# Patient Record
Sex: FEMALE | Race: WHITE | NOT HISPANIC OR LATINO | Employment: UNEMPLOYED | URBAN - NONMETROPOLITAN AREA
[De-identification: names, ages, dates, MRNs, and addresses within clinical notes are randomized per-mention and may not be internally consistent; named-entity substitution may affect disease eponyms.]

---

## 2023-02-19 RX ORDER — EZETIMIBE 10 MG/1
1 TABLET ORAL DAILY
Qty: 30 TABLET | Refills: 2 | COMMUNITY
Start: 2022-12-01 | End: 2023-06-09 | Stop reason: SDUPTHER

## 2023-02-19 RX ORDER — TRAZODONE HYDROCHLORIDE 100 MG/1
100 TABLET ORAL NIGHTLY
COMMUNITY
Start: 2022-12-01 | End: 2023-07-19 | Stop reason: ALTCHOICE

## 2023-02-19 RX ORDER — METOPROLOL TARTRATE 25 MG/1
25 TABLET, FILM COATED ORAL 2 TIMES DAILY
COMMUNITY
Start: 2022-12-01 | End: 2023-06-09 | Stop reason: SDUPTHER

## 2023-02-19 RX ORDER — POTASSIUM CHLORIDE 750 MG/1
10 TABLET, FILM COATED, EXTENDED RELEASE ORAL DAILY
COMMUNITY
End: 2023-07-21 | Stop reason: SDUPTHER

## 2023-02-19 RX ORDER — ROSUVASTATIN CALCIUM 10 MG/1
20 TABLET, COATED ORAL DAILY
COMMUNITY
Start: 2022-12-01 | End: 2023-06-09 | Stop reason: SDUPTHER

## 2023-02-19 RX ORDER — FERROUS SULFATE 325(65) MG
65 TABLET ORAL
COMMUNITY
End: 2023-06-09 | Stop reason: SDUPTHER

## 2023-02-19 RX ORDER — DULOXETIN HYDROCHLORIDE 60 MG/1
60 CAPSULE, DELAYED RELEASE ORAL DAILY
COMMUNITY
Start: 2022-12-01 | End: 2023-06-09 | Stop reason: SDUPTHER

## 2023-02-19 RX ORDER — GABAPENTIN 100 MG/1
100 CAPSULE ORAL 3 TIMES DAILY
COMMUNITY
End: 2023-07-19 | Stop reason: ALTCHOICE

## 2023-02-23 PROBLEM — I67.2 INTRACRANIAL ARTERIOSCLEROSIS: Status: ACTIVE | Noted: 2023-02-23

## 2023-02-23 PROBLEM — I48.0 PAROXYSMAL ATRIAL FIBRILLATION (MULTI): Status: ACTIVE | Noted: 2023-02-23

## 2023-02-23 PROBLEM — G89.0 CENTRAL PAIN SYNDROME: Status: ACTIVE | Noted: 2023-02-23

## 2023-02-23 PROBLEM — G47.00 INSOMNIA: Status: ACTIVE | Noted: 2023-02-23

## 2023-02-23 PROBLEM — E78.00 HYPERCHOLESTEROLEMIA: Status: ACTIVE | Noted: 2023-02-23

## 2023-02-23 PROBLEM — M81.0 OSTEOPOROSIS: Status: ACTIVE | Noted: 2023-02-23

## 2023-02-23 PROBLEM — I63.9 STROKE (MULTI): Status: ACTIVE | Noted: 2023-02-23

## 2023-02-23 PROBLEM — F32.A DEPRESSION: Status: ACTIVE | Noted: 2023-02-23

## 2023-02-23 PROBLEM — I10 HTN (HYPERTENSION): Status: ACTIVE | Noted: 2023-02-23

## 2023-03-09 LAB
CALCIDIOL (25 OH VITAMIN D3) (NG/ML) IN SER/PLAS: 15 NG/ML
PARATHYRIN INTACT (PG/ML) IN SER/PLAS: 53.1 PG/ML (ref 18.5–88)

## 2023-03-17 ENCOUNTER — TELEPHONE (OUTPATIENT)
Dept: PRIMARY CARE | Facility: CLINIC | Age: 58
End: 2023-03-17
Payer: MEDICARE

## 2023-03-17 DIAGNOSIS — R32 URINARY INCONTINENCE, UNSPECIFIED TYPE: Primary | ICD-10-CM

## 2023-03-17 RX ORDER — LORATADINE 5 MG/5ML
SOLUTION ORAL
Qty: 120 EACH | Refills: 0 | Status: SHIPPED | OUTPATIENT
Start: 2023-03-17 | End: 2023-04-13 | Stop reason: SDUPTHER

## 2023-04-02 LAB — URINE CULTURE: NORMAL

## 2023-04-13 DIAGNOSIS — R32 URINARY INCONTINENCE, UNSPECIFIED TYPE: ICD-10-CM

## 2023-04-13 RX ORDER — LORATADINE 5 MG/5ML
SOLUTION ORAL
Qty: 120 EACH | Refills: 0 | Status: SHIPPED | OUTPATIENT
Start: 2023-04-13 | End: 2024-04-19 | Stop reason: WASHOUT

## 2023-04-18 ENCOUNTER — OFFICE VISIT (OUTPATIENT)
Dept: PRIMARY CARE | Facility: CLINIC | Age: 58
End: 2023-04-18
Payer: MEDICAID

## 2023-04-18 VITALS
HEIGHT: 65 IN | SYSTOLIC BLOOD PRESSURE: 120 MMHG | HEART RATE: 78 BPM | WEIGHT: 225.8 LBS | BODY MASS INDEX: 37.62 KG/M2 | RESPIRATION RATE: 18 BRPM | OXYGEN SATURATION: 98 % | DIASTOLIC BLOOD PRESSURE: 78 MMHG

## 2023-04-18 DIAGNOSIS — M54.9 BACK PAIN, UNSPECIFIED BACK LOCATION, UNSPECIFIED BACK PAIN LATERALITY, UNSPECIFIED CHRONICITY: ICD-10-CM

## 2023-04-18 DIAGNOSIS — E87.6 HYPOKALEMIA: ICD-10-CM

## 2023-04-18 DIAGNOSIS — M81.0 OSTEOPOROSIS, UNSPECIFIED OSTEOPOROSIS TYPE, UNSPECIFIED PATHOLOGICAL FRACTURE PRESENCE: ICD-10-CM

## 2023-04-18 DIAGNOSIS — R32 URINARY INCONTINENCE IN FEMALE: ICD-10-CM

## 2023-04-18 DIAGNOSIS — L30.9 ECZEMA, UNSPECIFIED TYPE: ICD-10-CM

## 2023-04-18 PROBLEM — K62.5 RECTAL BLEEDING: Status: RESOLVED | Noted: 2023-04-18 | Resolved: 2023-04-18

## 2023-04-18 PROBLEM — R82.998 URINE LEUKOCYTES: Status: ACTIVE | Noted: 2023-04-18

## 2023-04-18 PROBLEM — K59.00 CONSTIPATION: Status: RESOLVED | Noted: 2023-04-18 | Resolved: 2023-04-18

## 2023-04-18 PROBLEM — E55.9 VITAMIN D DEFICIENCY: Status: ACTIVE | Noted: 2023-04-18

## 2023-04-18 PROBLEM — I69.320 CVA, OLD, APHASIA: Status: ACTIVE | Noted: 2023-04-18

## 2023-04-18 PROCEDURE — 3078F DIAST BP <80 MM HG: CPT | Performed by: INTERNAL MEDICINE

## 2023-04-18 PROCEDURE — 3074F SYST BP LT 130 MM HG: CPT | Performed by: INTERNAL MEDICINE

## 2023-04-18 PROCEDURE — 99213 OFFICE O/P EST LOW 20 MIN: CPT | Performed by: INTERNAL MEDICINE

## 2023-04-18 PROCEDURE — 1036F TOBACCO NON-USER: CPT | Performed by: INTERNAL MEDICINE

## 2023-04-18 RX ORDER — CALCIUM CARB, CITRATE, MALATE 250 MG
99 CAPSULE ORAL DAILY
COMMUNITY
End: 2023-07-19 | Stop reason: ALTCHOICE

## 2023-04-18 RX ORDER — HYDROXYZINE HYDROCHLORIDE 50 MG/1
50 TABLET, FILM COATED ORAL EVERY 6 HOURS PRN
COMMUNITY
End: 2023-07-19 | Stop reason: ALTCHOICE

## 2023-04-18 RX ORDER — PREDNISONE 20 MG/1
40 TABLET ORAL DAILY
Qty: 10 TABLET | Refills: 0 | Status: SHIPPED | OUTPATIENT
Start: 2023-04-18 | End: 2023-04-23

## 2023-04-18 RX ORDER — METHOCARBAMOL 500 MG/1
500 TABLET, FILM COATED ORAL 2 TIMES DAILY PRN
Qty: 40 TABLET | Refills: 0 | Status: SHIPPED | OUTPATIENT
Start: 2023-04-18 | End: 2023-07-19 | Stop reason: ALTCHOICE

## 2023-04-18 RX ORDER — ERGOCALCIFEROL 1.25 MG/1
1.25 CAPSULE ORAL
COMMUNITY
End: 2023-07-19 | Stop reason: ALTCHOICE

## 2023-04-18 RX ORDER — TRIAMCINOLONE ACETONIDE 1 MG/G
OINTMENT TOPICAL 2 TIMES DAILY
Qty: 15 G | Refills: 0 | Status: SHIPPED | OUTPATIENT
Start: 2023-04-18 | End: 2023-05-18

## 2023-04-18 RX ORDER — PRAZOSIN HYDROCHLORIDE 1 MG/1
1 CAPSULE ORAL NIGHTLY
COMMUNITY
End: 2023-07-19 | Stop reason: ALTCHOICE

## 2023-04-18 RX ORDER — ARIPIPRAZOLE 2 MG/1
2 TABLET ORAL NIGHTLY
COMMUNITY

## 2023-04-18 ASSESSMENT — ENCOUNTER SYMPTOMS
NERVOUS/ANXIOUS: 1
FATIGUE: 1
SLEEP DISTURBANCE: 1
BACK PAIN: 1
WEAKNESS: 1

## 2023-04-18 ASSESSMENT — PAIN SCALES - GENERAL: PAINLEVEL: 8

## 2023-04-18 NOTE — PROGRESS NOTES
Subjective   Patient ID:   Valeri Mtz is a 57 y.o. female with PMH remarkable for PMHx of Afib (Apixaban & metoprolol tart stopped 1mo prior), polymyalgia rheumatica (prednisone stopped 1mo prior), hx of strokes (ASA + statin stopped 1 mo prior d/t lack of insurance, hx of mitral valve replacement who presents to the office today for discuss medications and Urinary Incontinence.  - pt was seen in the ED on 4/4/2023 for a psych eval. Sent to [facility].     Pt reports that she is not doing well today.  She is living with her sister and she is withholding medications from her. She moved her to the basement because she is unable to clean her whole house for her. She took away her cell phone as well. Pt felt that her family would be better off without her since she is a burden. This is what prompted her recent admission to [facility].   Pt's son is 30 y/o and is aware of situation. He lives at the house as well.   Reports that her sister makes her feel like everything is her fault.   Pt is going to make a new disability hearing since she missed hers when she was hospitalized.         REVIEW OF SYSTEMS:  Review of Systems   Constitutional:  Positive for fatigue.   Musculoskeletal:  Positive for back pain and gait problem.   Skin:  Positive for rash.   Neurological:  Positive for weakness.   Psychiatric/Behavioral:  Positive for sleep disturbance. The patient is nervous/anxious.    All other systems reviewed and are negative.    12 point review of systems negative unless stated above in HPI    VITAL SIGNS:  Vitals:    04/18/23 1005   BP: 120/78   Pulse: 78   Resp: 18   SpO2: 98%       ALLERGIES:  Allergies   Allergen Reactions    Kiwi Other        PHYSICAL EXAM:  Physical Exam  Vitals reviewed.   Constitutional:       General: She is not in acute distress.     Appearance: Normal appearance. She is not ill-appearing.   HENT:      Head: Normocephalic and atraumatic.      Right Ear: Tympanic membrane and  external ear normal.      Left Ear: Tympanic membrane and external ear normal.      Nose: Nose normal.      Mouth/Throat:      Mouth: Mucous membranes are moist.      Pharynx: Oropharynx is clear.   Eyes:      Conjunctiva/sclera: Conjunctivae normal.      Pupils: Pupils are equal, round, and reactive to light.   Cardiovascular:      Rate and Rhythm: Normal rate and regular rhythm.      Heart sounds: Normal heart sounds. No murmur heard.  Pulmonary:      Effort: Pulmonary effort is normal. No respiratory distress.      Breath sounds: Normal breath sounds. No wheezing.   Abdominal:      General: There is no distension.      Palpations: Abdomen is soft. There is no mass.      Tenderness: There is no abdominal tenderness.   Musculoskeletal:         General: Normal range of motion.      Cervical back: Normal range of motion and neck supple.   Skin:     General: Skin is warm and dry.      Findings: Rash present.   Neurological:      General: No focal deficit present.      Mental Status: She is alert and oriented to person, place, and time.      Sensory: No sensory deficit.      Motor: Weakness present.      Coordination: Coordination normal.      Gait: Gait abnormal.   Psychiatric:         Mood and Affect: Mood normal.         Behavior: Behavior normal.         MEDICATIONS:  Current Outpatient Medications on File Prior to Visit   Medication Sig Dispense Refill    ARIPiprazole (Abilify) 2 mg tablet Take 1 tablet (2 mg) by mouth once daily at bedtime.      calcium carbonate (CALCIUM 500 ORAL) Take 1,200 mg by mouth once daily.      diaper,brief,adult,disposable (Briefs, Adult-Extra Large) misc Uses up to 4 daily 120 each 0    ergocalciferol (Vitamin D-2) 1.25 MG (24151 UT) capsule Take 1 capsule (1,250 mcg) by mouth 1 (one) time per week.      ferrous sulfate 325 (65 Fe) MG tablet Take 1 tablet (65 mg of iron) by mouth once daily with a meal.      gabapentin (Neurontin) 100 mg capsule Take 1 capsule (100 mg) by mouth in the  morning and 1 capsule (100 mg) in the evening and 1 capsule (100 mg) before bedtime.      hydrOXYzine HCL (Atarax) 50 mg tablet Take 1 tablet (50 mg) by mouth every 6 hours if needed for anxiety.      potassium citrate 99 mg capsule Take 99 mg by mouth once daily.      prazosin (Minipress) 1 mg capsule Take 1 capsule (1 mg) by mouth once daily at bedtime.      rivaroxaban (Xarelto) 20 mg tablet Take 1 tablet (20 mg) by mouth once daily in the evening. Take with meals. Take with food.      DULoxetine (Cymbalta) 60 mg DR capsule Take 1 capsule (60 mg) by mouth once daily.      ezetimibe (Zetia) 10 mg tablet Take 1 tablet (10 mg) by mouth once daily. 30 tablet 2    metoprolol tartrate (Lopressor) 25 mg tablet Take 1 tablet (25 mg) by mouth in the morning and 1 tablet (25 mg) in the evening.      potassium chloride CR (Klor-Con) 10 mEq ER tablet Take 1 tablet (10 mEq) by mouth once daily. Do not crush, chew, or split.      rosuvastatin (Crestor) 10 mg tablet Take 2 tablets (20 mg) by mouth once daily.      traZODone (Desyrel) 100 mg tablet Take 1 tablet (100 mg) by mouth once daily at bedtime.      [DISCONTINUED] diaper,brief,adult,disposable (Briefs, Adult-Extra Large) misc Uses up to 4 daily 120 each 0     No current facility-administered medications on file prior to visit.     Labs:   Lab Results   Component Value Date    WBC 6.5 04/04/2023    HGB 14.5 04/04/2023    HCT 43.6 04/04/2023     04/04/2023    CHOL 266 (H) 11/28/2022    TRIG 174 (H) 11/28/2022    HDL 48.4 11/28/2022    ALT 17 04/04/2023    AST 26 04/04/2023     04/04/2023    K 3.7 04/04/2023     04/04/2023    CREATININE 0.97 04/04/2023    BUN 7 04/04/2023    CO2 29 04/04/2023    TSH 1.12 04/04/2023    INR 1.0 11/27/2022    HGBA1C 5.2 11/28/2022     ASSESSMENT AND PLAN:  Assessment/Plan   Diagnoses and all orders for this visit:  Eczema, unspecified type  Comments:  - start on kenalog 0.1% cream BID  - call if no improvement  Back pain,  unspecified back location, unspecified back pain laterality, unspecified chronicity  Comments:  - reviewed MRI from 2022  - start on prednisone 40mg QD x5d  - start on robaxin 500mg BID PRN for spasm  Hypokalemia  Comments:  - c/w current supplementation  - will recheck next visit  Osteoporosis, unspecified osteoporosis type, unspecified pathological fracture presence  Comments:  - following with rheumatology for this  - c/w injec  Urinary incontinence in female  Comments:  - FU with GYN regarding this    CV- Afib, s/p MVR, Strokes, HTN  - c/w statin, zetia  - c/w xarelto for stroke prevention  - c/w metoprolol for rate control    Depression, Insomnia  - c/w cymbalta, trazodone, Abilify  - recent hospitalization at [facility]  - c/w 60mg cymbalta for now. We will confirm dosage with [facility].     Back Pain  - woke pt up at night, right side of back. No radiation.   - appears to be inflammation  - reviewed past imaging (MRI of lumbar and thoracic in 2022)  - start on prednisone 40mg every day x5d    Urinary Incontinence  - wearing adult briefs  - plans to FU with GYN soon.     Iron Deficiency  - c/w ferrous sulfate    H/o hypokalemia  - c/w supplementation  - K 3.7 on 4/4/2023    Osteoporosis  - noted on dexa from 2/15/2023  - following with rheumatology. On injections.       ------  Written by Lolita Maier LPN, acting as a scribe for Dr. Juarez. This note accurately reflects the work and decisions made by Dr. Juarez.     I, Dr. Juarez, attest all medical record entries made by the scribe were under my direction and were personally dictated by me. I have reviewed the chart and agree that the record accurately reflects my performance of the history, physical exam, and assessment and plan.

## 2023-04-19 DIAGNOSIS — R32 URINARY INCONTINENCE, UNSPECIFIED TYPE: ICD-10-CM

## 2023-04-25 ENCOUNTER — TELEPHONE (OUTPATIENT)
Dept: PRIMARY CARE | Facility: CLINIC | Age: 58
End: 2023-04-25
Payer: MEDICARE

## 2023-04-26 DIAGNOSIS — R32 URINARY INCONTINENCE, UNSPECIFIED TYPE: ICD-10-CM

## 2023-04-26 DIAGNOSIS — I63.9 CEREBROVASCULAR ACCIDENT (CVA), UNSPECIFIED MECHANISM (MULTI): ICD-10-CM

## 2023-04-28 RX ORDER — LORATADINE 5 MG/5ML
SOLUTION ORAL
Qty: 120 EACH | Refills: 0 | Status: SHIPPED | OUTPATIENT
Start: 2023-04-28 | End: 2024-04-19 | Stop reason: WASHOUT

## 2023-05-17 LAB
AMPHETAMINE (PRESENCE) IN URINE BY SCREEN METHOD: ABNORMAL
BARBITURATES PRESENCE IN URINE BY SCREEN METHOD: ABNORMAL
BENZODIAZEPINE (PRESENCE) IN URINE BY SCREEN METHOD: ABNORMAL
CANNABINOIDS IN URINE BY SCREEN METHOD: ABNORMAL
COCAINE (PRESENCE) IN URINE BY SCREEN METHOD: ABNORMAL
DRUG SCREEN COMMENT URINE: ABNORMAL
FENTANYL URINE: ABNORMAL
METHADONE (PRESENCE) IN URINE BY SCREEN METHOD: ABNORMAL
OPIATES (PRESENCE) IN URINE BY SCREEN METHOD: ABNORMAL
OXYCODONE (PRESENCE) IN URINE BY SCREEN METHOD: ABNORMAL
PHENCYCLIDINE (PRESENCE) IN URINE BY SCREEN METHOD: ABNORMAL

## 2023-05-25 LAB — 11-NOR-9-CARBOXY-THC, URN, QUANT: 26 NG/ML

## 2023-06-09 DIAGNOSIS — E78.00 HYPERCHOLESTEROLEMIA: ICD-10-CM

## 2023-06-09 DIAGNOSIS — I10 HYPERTENSION, UNSPECIFIED TYPE: ICD-10-CM

## 2023-06-09 DIAGNOSIS — I48.0 PAROXYSMAL ATRIAL FIBRILLATION (MULTI): ICD-10-CM

## 2023-06-09 DIAGNOSIS — F32.A DEPRESSION, UNSPECIFIED DEPRESSION TYPE: ICD-10-CM

## 2023-06-09 DIAGNOSIS — E87.6 HYPOKALEMIA: ICD-10-CM

## 2023-06-09 RX ORDER — ROSUVASTATIN CALCIUM 10 MG/1
20 TABLET, COATED ORAL DAILY
Qty: 60 TABLET | Refills: 2 | Status: SHIPPED | OUTPATIENT
Start: 2023-06-09 | End: 2023-11-02 | Stop reason: ALTCHOICE

## 2023-06-09 RX ORDER — DULOXETIN HYDROCHLORIDE 60 MG/1
60 CAPSULE, DELAYED RELEASE ORAL DAILY
Qty: 30 CAPSULE | Refills: 4 | Status: SHIPPED | OUTPATIENT
Start: 2023-06-09 | End: 2024-04-18

## 2023-06-09 RX ORDER — EZETIMIBE 10 MG/1
10 TABLET ORAL DAILY
Qty: 30 TABLET | Refills: 2 | Status: SHIPPED | OUTPATIENT
Start: 2023-06-09 | End: 2023-09-16

## 2023-06-09 RX ORDER — METOPROLOL TARTRATE 25 MG/1
25 TABLET, FILM COATED ORAL 2 TIMES DAILY
Qty: 60 TABLET | Refills: 2 | Status: SHIPPED | OUTPATIENT
Start: 2023-06-09 | End: 2023-09-16

## 2023-06-09 RX ORDER — FERROUS SULFATE 325(65) MG
65 TABLET ORAL
Qty: 90 TABLET | Refills: 0 | Status: SHIPPED | OUTPATIENT
Start: 2023-06-09 | End: 2023-12-20

## 2023-06-17 LAB — CALCIDIOL (25 OH VITAMIN D3) (NG/ML) IN SER/PLAS: 35 NG/ML

## 2023-06-27 ENCOUNTER — HOSPITAL ENCOUNTER (OUTPATIENT)
Dept: DATA CONVERSION | Facility: HOSPITAL | Age: 58
End: 2023-06-27
Attending: SURGERY | Admitting: SURGERY
Payer: MEDICARE

## 2023-06-27 DIAGNOSIS — I48.0 PAROXYSMAL ATRIAL FIBRILLATION (MULTI): ICD-10-CM

## 2023-06-27 DIAGNOSIS — Z87.891 PERSONAL HISTORY OF NICOTINE DEPENDENCE: ICD-10-CM

## 2023-06-27 DIAGNOSIS — I69.320 APHASIA FOLLOWING CEREBRAL INFARCTION: ICD-10-CM

## 2023-06-27 DIAGNOSIS — I25.2 OLD MYOCARDIAL INFARCTION: ICD-10-CM

## 2023-06-27 DIAGNOSIS — Z95.2 PRESENCE OF PROSTHETIC HEART VALVE: ICD-10-CM

## 2023-06-27 DIAGNOSIS — D12.7 BENIGN NEOPLASM OF RECTOSIGMOID JUNCTION: ICD-10-CM

## 2023-06-27 DIAGNOSIS — Z79.01 LONG TERM (CURRENT) USE OF ANTICOAGULANTS: ICD-10-CM

## 2023-06-27 DIAGNOSIS — K62.5 HEMORRHAGE OF ANUS AND RECTUM: ICD-10-CM

## 2023-06-27 DIAGNOSIS — I10 ESSENTIAL (PRIMARY) HYPERTENSION: ICD-10-CM

## 2023-06-27 DIAGNOSIS — E78.5 HYPERLIPIDEMIA, UNSPECIFIED: ICD-10-CM

## 2023-07-05 LAB
COMPLETE PATHOLOGY REPORT: NORMAL
CONVERTED CLINICAL DIAGNOSIS-HISTORY: NORMAL
CONVERTED FINAL DIAGNOSIS: NORMAL
CONVERTED FINAL REPORT PDF LINK TO COPY AND PASTE: NORMAL
CONVERTED GROSS DESCRIPTION: NORMAL

## 2023-07-17 DIAGNOSIS — I48.0 PAROXYSMAL ATRIAL FIBRILLATION (MULTI): ICD-10-CM

## 2023-07-17 RX ORDER — RIVAROXABAN 20 MG/1
TABLET, FILM COATED ORAL
Qty: 90 TABLET | Refills: 0 | Status: SHIPPED | OUTPATIENT
Start: 2023-07-17 | End: 2023-10-30

## 2023-07-19 ENCOUNTER — OFFICE VISIT (OUTPATIENT)
Dept: PRIMARY CARE | Facility: CLINIC | Age: 58
End: 2023-07-19
Payer: MEDICAID

## 2023-07-19 VITALS
OXYGEN SATURATION: 92 % | HEIGHT: 65 IN | WEIGHT: 225.8 LBS | HEART RATE: 69 BPM | DIASTOLIC BLOOD PRESSURE: 67 MMHG | RESPIRATION RATE: 18 BRPM | SYSTOLIC BLOOD PRESSURE: 114 MMHG | BODY MASS INDEX: 37.62 KG/M2

## 2023-07-19 DIAGNOSIS — R32 URINARY INCONTINENCE IN FEMALE: ICD-10-CM

## 2023-07-19 DIAGNOSIS — G89.29 CHRONIC BACK PAIN, UNSPECIFIED BACK LOCATION, UNSPECIFIED BACK PAIN LATERALITY: ICD-10-CM

## 2023-07-19 DIAGNOSIS — F41.1 GENERALIZED ANXIETY DISORDER: ICD-10-CM

## 2023-07-19 DIAGNOSIS — Z95.2 S/P MITRAL VALVE REPLACEMENT: ICD-10-CM

## 2023-07-19 DIAGNOSIS — I63.9 CEREBROVASCULAR ACCIDENT (CVA), UNSPECIFIED MECHANISM (MULTI): ICD-10-CM

## 2023-07-19 DIAGNOSIS — G47.00 INSOMNIA, UNSPECIFIED TYPE: ICD-10-CM

## 2023-07-19 DIAGNOSIS — M54.9 CHRONIC BACK PAIN, UNSPECIFIED BACK LOCATION, UNSPECIFIED BACK PAIN LATERALITY: ICD-10-CM

## 2023-07-19 DIAGNOSIS — F33.9 RECURRENT MAJOR DEPRESSIVE DISORDER, REMISSION STATUS UNSPECIFIED (CMS-HCC): Chronic | ICD-10-CM

## 2023-07-19 DIAGNOSIS — M81.0 OSTEOPOROSIS WITHOUT CURRENT PATHOLOGICAL FRACTURE, UNSPECIFIED OSTEOPOROSIS TYPE: ICD-10-CM

## 2023-07-19 DIAGNOSIS — R32 URINARY INCONTINENCE, UNSPECIFIED TYPE: ICD-10-CM

## 2023-07-19 DIAGNOSIS — F12.90 CANNABIS USE DISORDER: ICD-10-CM

## 2023-07-19 DIAGNOSIS — I48.0 PAROXYSMAL ATRIAL FIBRILLATION (MULTI): ICD-10-CM

## 2023-07-19 DIAGNOSIS — I10 HYPERTENSION, UNSPECIFIED TYPE: ICD-10-CM

## 2023-07-19 DIAGNOSIS — E78.00 HYPERCHOLESTEROLEMIA: ICD-10-CM

## 2023-07-19 PROBLEM — M54.2 CERVICALGIA: Status: ACTIVE | Noted: 2023-02-23

## 2023-07-19 PROBLEM — F40.9 FEAR OF: Status: ACTIVE | Noted: 2023-07-19

## 2023-07-19 PROBLEM — M54.16 LUMBAR RADICULITIS: Status: ACTIVE | Noted: 2023-07-19

## 2023-07-19 PROBLEM — M47.817 FACET ARTHROPATHY, LUMBOSACRAL: Status: ACTIVE | Noted: 2023-07-19

## 2023-07-19 PROBLEM — M25.562 CHRONIC PAIN OF LEFT KNEE: Status: ACTIVE | Noted: 2023-07-19

## 2023-07-19 PROBLEM — Z86.73 HISTORY OF STROKE: Status: ACTIVE | Noted: 2023-07-19

## 2023-07-19 PROCEDURE — 3078F DIAST BP <80 MM HG: CPT | Performed by: INTERNAL MEDICINE

## 2023-07-19 PROCEDURE — 3074F SYST BP LT 130 MM HG: CPT | Performed by: INTERNAL MEDICINE

## 2023-07-19 PROCEDURE — 1036F TOBACCO NON-USER: CPT | Performed by: INTERNAL MEDICINE

## 2023-07-19 PROCEDURE — 99213 OFFICE O/P EST LOW 20 MIN: CPT | Performed by: INTERNAL MEDICINE

## 2023-07-19 RX ORDER — TRIAMCINOLONE ACETONIDE 1 MG/G
OINTMENT TOPICAL
COMMUNITY
Start: 2023-04-18

## 2023-07-19 RX ORDER — TERIPARATIDE 250 UG/ML
20 INJECTION, SOLUTION SUBCUTANEOUS
COMMUNITY
Start: 2023-07-18 | End: 2023-12-22 | Stop reason: SDUPTHER

## 2023-07-19 RX ORDER — MELATONIN 5 MG
5 CAPSULE ORAL NIGHTLY
Qty: 30 CAPSULE | Refills: 2 | Status: SHIPPED | OUTPATIENT
Start: 2023-07-19 | End: 2023-10-16

## 2023-07-19 RX ORDER — OXYBUTYNIN CHLORIDE 5 MG/1
5 TABLET ORAL 2 TIMES DAILY
Qty: 60 TABLET | Refills: 5 | Status: SHIPPED | OUTPATIENT
Start: 2023-07-19 | End: 2024-01-17

## 2023-07-19 ASSESSMENT — PATIENT HEALTH QUESTIONNAIRE - PHQ9
SUM OF ALL RESPONSES TO PHQ9 QUESTIONS 1 AND 2: 2
1. LITTLE INTEREST OR PLEASURE IN DOING THINGS: SEVERAL DAYS
2. FEELING DOWN, DEPRESSED OR HOPELESS: SEVERAL DAYS
10. IF YOU CHECKED OFF ANY PROBLEMS, HOW DIFFICULT HAVE THESE PROBLEMS MADE IT FOR YOU TO DO YOUR WORK, TAKE CARE OF THINGS AT HOME, OR GET ALONG WITH OTHER PEOPLE: SOMEWHAT DIFFICULT

## 2023-07-19 NOTE — PROGRESS NOTES
SUBJECTIVE: She states that she has been eating as good as she knows how to, she states no matter what she eats, she gains weight. She states she is still having issues with incontinence of urine. She states she is now living in a small apartment independently, no longer speaks with her sister. She states she has not taken Gabapentin for over a month now because it did not get refilled. She states she went to see pain management and was told she needed to see psych. She admits she does use marijuana occasionally and it helps with her pain. She is going to try to get her medical marijuana card once she can afford it.     HPI Valeri Mtz is a 57 y.o. female with PMH remarkable for PMHx of Afib (Apixaban & metoprolol tart stopped 1mo prior), polymyalgia rheumatica (prednisone stopped 1mo prior), hx of strokes (ASA + statin stopped 1 mo prior d/t lack of insurance, hx of mitral valve replacement   who presents to the office today for four month check up.    HEALTH MAINTENANCE: FOLLOW UP  Smoking: former smoker  Mammogram (40-75): 23 WNL  Pap smear (21-65): per GYN 23 normal  and negative HPV  Labs: 23  Colonoscopy (45-75): 23  Lung cancer (55-80 + 30 pack year + smoking/quit in last 15 years):   DEXA (65+, q 2 years): 2/15/23 showed osteoporosis    SOCIAL HISTORY:  Social History     Tobacco Use    Smoking status: Former     Types: Cigarettes     Quit date:      Years since quittin.5    Smokeless tobacco: Never   Substance Use Topics    Alcohol use: Yes     Alcohol/week: 1.0 standard drink of alcohol     Types: 1 Standard drinks or equivalent per week    Drug use: Yes     Types: Marijuana     IMMUNIZATIONS:    There is no immunization history on file for this patient.    REVIEW OF SYSTEMS:  Review of Systems  12 point review of systems negative unless stated above in HPI    ALLERGIES:  Allergies   Allergen Reactions    Kiwi Other      VITAL SIGNS:  Vitals:    23 1414   BP: 114/67  "  Pulse: 69   Resp: 18   SpO2: 92%   Weight: 102 kg (225 lb 12.8 oz)   Height: 1.651 m (5' 5\")     PHYSICAL EXAM:  General: Alert and oriented x3, well nourished, no acute distress.  Eye: PERRL, EOMI, normal conjunctiva.  HENT: Normocephalic, clear tympanic membranes, moist oral mucosa, no sinus tenderness.  Neck: Supple, non-tender, no carotid bruits, no JVD, no lymphadenopathy.  Lungs: Clear to auscultation, non-labored respiration.  Heart: Normal rate, regular rhythm, no murmur, gallop or edema.  Abdomen: Soft, non-tender, non-distended, normal bowel sounds, no masses.  Musculoskeletal: Normal range of motion and strength, no tenderness or swelling.  Skin: Skin is warm, dry and pink, no rashes or lesions.  Neurologic: Alert & Oriented x3, intact senses, motor, response and reflexes ok, normal strength, CN II-XII intact.  Psychiatric: Cooperative, appropriate mood and affect.    MEDICATIONS:  Current Outpatient Medications on File Prior to Visit   Medication Sig Dispense Refill    ARIPiprazole (Abilify) 2 mg tablet Take 1 tablet (2 mg) by mouth once daily at bedtime.      calcium carbonate (CALCIUM 500 ORAL) Take 1,200 mg by mouth once daily.      diaper,brief,adult,disposable (Briefs, Adult-Extra Large) misc Uses up to 4 daily 120 each 0    diaper,brief,adult,disposable (Briefs, Adult-Extra Large) misc USE UP TO FOUR TIMES DAILY AS NEEDED 120 each 0    DULoxetine (Cymbalta) 60 mg DR capsule Take 1 capsule (60 mg) by mouth once daily. 30 capsule 4    ergocalciferol (Vitamin D-2) 1.25 MG (38145 UT) capsule Take 1 capsule (1,250 mcg) by mouth 1 (one) time per week.      ezetimibe (Zetia) 10 mg tablet Take 1 tablet (10 mg) by mouth once daily. 30 tablet 2    ferrous sulfate 325 (65 Fe) MG tablet Take 1 tablet (65 mg of iron) by mouth once daily with a meal. 90 tablet 0    gabapentin (Neurontin) 100 mg capsule Take 1 capsule (100 mg) by mouth in the morning and 1 capsule (100 mg) in the evening and 1 capsule (100 mg) " before bedtime.      hydrOXYzine HCL (Atarax) 50 mg tablet Take 1 tablet (50 mg) by mouth every 6 hours if needed for anxiety.      methocarbamol (Robaxin) 500 mg tablet Take 1 tablet (500 mg) by mouth 2 times a day as needed for muscle spasms. 40 tablet 0    metoprolol tartrate (Lopressor) 25 mg tablet Take 1 tablet (25 mg) by mouth 2 times a day. 60 tablet 2    potassium chloride CR (Klor-Con) 10 mEq ER tablet Take 1 tablet (10 mEq) by mouth once daily. Do not crush, chew, or split.      potassium citrate 99 mg capsule Take 99 mg by mouth once daily.      prazosin (Minipress) 1 mg capsule Take 1 capsule (1 mg) by mouth once daily at bedtime.      rosuvastatin (Crestor) 10 mg tablet Take 2 tablets (20 mg) by mouth once daily. 60 tablet 2    traZODone (Desyrel) 100 mg tablet Take 1 tablet (100 mg) by mouth once daily at bedtime.      Xarelto 20 mg tablet TAKE 1 TABLET (20 MG) BY MOUTH ONCE DAILY IN THE EVENING. TAKE WITH MEALS. TAKE WITH FOOD. 90 tablet 0    [DISCONTINUED] rivaroxaban (Xarelto) 20 mg tablet Take 1 tablet (20 mg) by mouth once daily in the evening. Take with meals. Take with food. 90 tablet 0     No current facility-administered medications on file prior to visit.        LABORATORY DATA:  Lab Results   Component Value Date    WBC 6.5 04/04/2023    HGB 14.5 04/04/2023    HCT 43.6 04/04/2023     04/04/2023    CHOL 266 (H) 11/28/2022    TRIG 174 (H) 11/28/2022    HDL 48.4 11/28/2022    ALT 17 04/04/2023    AST 26 04/04/2023     04/04/2023    K 3.7 04/04/2023     04/04/2023    CREATININE 0.97 04/04/2023    BUN 7 04/04/2023    CO2 29 04/04/2023    TSH 1.12 04/04/2023    INR 1.0 11/27/2022    HGBA1C 5.2 11/28/2022       ASSESSMENT AND PLAN:  Health Maintenance: follow up with [facility] remarkable for Urinary incontinece, constipation, history of CVA with pain in lower back and bilateral hips, HTN, HLD, Depression/anxiety, Afib, s/p mitral valve replacement  - reviewed most recent PAP, WNL and  negative for HPV  - she had recent colonoscopy on 06/27/23 but reports she was told she has to repeat in four months due to poor prep  - she is still having urinary incontinence for which she was referred to GYN, reviewed most recent note and patient was treated for UTI, advised to RTO if symptoms not improve and she is still reporting urinary incontinence  - will start on Oxybutynin 5mg bid, advised to call office after a couple weeks if ineffective as we can increase dose  - she reports she would like to try Melatonin for sleep, prescription sent in for Melatonin 5mg at bedtime for insomnia  - she saw Dr. 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  - she had reports on previous exam that gabapentin made her sleepy, was advised to take at night  - reviewed most recent bloodwork, she had recent toxology report positive for marijuana, ordered per pain management  - explained to patient that this is the reason controlled substances and gabapentin can not be prescribed  - she verbalized understanding and reports she is working on getting her medical marijuana card as it is effective for her pain  - she had recent inpatient psych stay at [facility], she has since moved out of her sister's home and is living in small apartment independently, and reports things are much better  - c/w Abilify and Cymbalta  - follow up with psych  - she is on Forteo injections for osteoporosis  - BP today is good, 114/67  - c/w Metoprolol for HTN and rate control  - c/w Xarelto to prevent any further strokes due to Afib  - c/w statin for HLD, most recent lipid panel November '22 with significantly elevated total cholesterol 266,   -Follow up in four months    --------------------  Written by Gogo Taylor LPN, acting as a scribe for Dr. Juarez. This note accurately reflects the work and decisions made by Dr. Juarez.     I, Dr. Juarez, attest all medical record entries made by the scribe were  under my direction and were personally dictated by me. I have reviewed the chart and agree that the record accurately reflects my performance of the history, physical exam, and assessment and plan.

## 2023-07-20 NOTE — PATIENT INSTRUCTIONS
It was great to see you in the office today! Here is what we discussed at your visit today:  Please continue to take your current medications   We will send in prescription for melatonin to help you with sleep  We will send in prescription for Oxybutynin to help with your urinary symptoms. Please call after a couple weeks if medication is not effective, we can increase dose  Follow up in four months

## 2023-07-21 DIAGNOSIS — E87.6 HYPOKALEMIA: ICD-10-CM

## 2023-07-24 DIAGNOSIS — I69.320 CVA, OLD, APHASIA: ICD-10-CM

## 2023-07-24 RX ORDER — POTASSIUM CHLORIDE 750 MG/1
10 TABLET, FILM COATED, EXTENDED RELEASE ORAL DAILY
Qty: 90 TABLET | Refills: 0 | Status: SHIPPED | OUTPATIENT
Start: 2023-07-24 | End: 2023-10-23

## 2023-09-29 VITALS
BODY MASS INDEX: 37.47 KG/M2 | HEART RATE: 71 BPM | TEMPERATURE: 96.8 F | HEIGHT: 65 IN | WEIGHT: 224.87 LBS | DIASTOLIC BLOOD PRESSURE: 67 MMHG | SYSTOLIC BLOOD PRESSURE: 150 MMHG | RESPIRATION RATE: 17 BRPM

## 2023-09-30 NOTE — H&P
History of Present Illness:   Pregnant/Lactating:  ·  Are You Pregnant no (1)   ·  Are You Currently Breastfeeding no (1)     History Present Illness:  Reason for surgery: Rectal bleedng   HPI:    Here for a colonoscopy - on lovenox bridge     Past Medical History/Past Surgical History - see Patient Info Tab/Significant Events     Allergies:        Allergies:  ·  NKDA :   ·  Kiwi : Other    Home Medication Review:   Home Medications Reviewed: yes     Impression/Procedure:   ·  Impression and Planned Procedure: Rectal bleeding   Colonoscopy       ERAS (Enhanced Recovery After Surgery):  ·  ERAS Patient: no     Review of Systems:   Review of Systems:  Constitutional: NEGATIVE: Fever, Chills, Anorexia,  Weight Loss, Malaise     All Other Systems: All other systems reviewed and  are negative       Vital Signs:  Temperature C: 36 degrees C   Temperature F: 96.8 degrees F   Heart Rate: 71 beats per minute   Respiratory Rate: 17 breath per minute   Blood Pressure Systolic: 150 mm/Hg   Blood Pressure Diastolic: 67 mm/Hg     Physical Exam by System:    Constitutional: Well developed , awake, alert   Eyes: Clear sclera   ENMT: mucous membranes moist, no apparent injury,  no lesions seen   Head/Neck: Neck supple, no masses   Respiratory/Thorax: Lungs clear to auscultation   Cardiovascular: Regular, rate and rhythm, no murmurs,   Gastrointestinal: Nondistended, soft, non-tender,  no masses   Musculoskeletal: ROM intact, no joint swelling, normal  strength   Extremities: normal extremities,   Neurological: Intact   Lymphatic: No significant lymphadenopathy   Psychological: Appropriate mood and behavior   Skin: Warm and dry, no lesions, no rashes     Consent:   COVID-19 Consent:  ·  COVID-19 Risk Consent Surgeon has reviewed key risks related to the risk of arina COVID-19 and if they contract COVID-19 what the risks are.       Electronic Signatures:  Huang Badillo)  (Signed 27-Jun-2023 08:55)   Authored: History of  "Present Illness, Allergies, Home  Medication Review, Impression/Procedure, ERAS, Review of Systems, Physical Exam, Consent, Note Completion      Last Updated: 27-Jun-2023 08:55 by Huang Badillo)    References:  1.  Data Referenced From \"Patient Profile - Preop v3\" 27-Jun-2023 07:40   "

## 2023-10-15 DIAGNOSIS — G47.00 INSOMNIA, UNSPECIFIED TYPE: ICD-10-CM

## 2023-10-16 RX ORDER — MELATONIN 5 MG
5 CAPSULE ORAL NIGHTLY
Qty: 30 CAPSULE | Refills: 2 | Status: SHIPPED | OUTPATIENT
Start: 2023-10-16 | End: 2024-01-17

## 2023-10-22 DIAGNOSIS — E87.6 HYPOKALEMIA: ICD-10-CM

## 2023-10-23 ENCOUNTER — PHARMACY VISIT (OUTPATIENT)
Dept: PHARMACY | Facility: CLINIC | Age: 58
End: 2023-10-23
Payer: MEDICAID

## 2023-10-23 PROCEDURE — RXMED WILLOW AMBULATORY MEDICATION CHARGE

## 2023-10-23 RX ORDER — POTASSIUM CHLORIDE 750 MG/1
TABLET, EXTENDED RELEASE ORAL
Qty: 90 TABLET | Refills: 0 | Status: SHIPPED | OUTPATIENT
Start: 2023-10-23 | End: 2024-01-22

## 2023-10-24 ENCOUNTER — SPECIALTY PHARMACY (OUTPATIENT)
Dept: PHARMACY | Facility: CLINIC | Age: 58
End: 2023-10-24

## 2023-10-28 DIAGNOSIS — I48.0 PAROXYSMAL ATRIAL FIBRILLATION (MULTI): ICD-10-CM

## 2023-10-30 RX ORDER — RIVAROXABAN 20 MG/1
TABLET, FILM COATED ORAL
Qty: 90 TABLET | Refills: 0 | Status: SHIPPED | OUTPATIENT
Start: 2023-10-30 | End: 2023-12-22 | Stop reason: SDUPTHER

## 2023-11-02 ENCOUNTER — OFFICE VISIT (OUTPATIENT)
Dept: CARDIOLOGY | Facility: CLINIC | Age: 58
End: 2023-11-02
Payer: MEDICARE

## 2023-11-02 DIAGNOSIS — I69.320 CVA, OLD, APHASIA: ICD-10-CM

## 2023-11-02 DIAGNOSIS — E78.00 HYPERCHOLESTEROLEMIA: ICD-10-CM

## 2023-11-02 DIAGNOSIS — Z95.2 S/P MITRAL VALVE REPLACEMENT: ICD-10-CM

## 2023-11-02 DIAGNOSIS — I48.0 PAROXYSMAL ATRIAL FIBRILLATION (MULTI): ICD-10-CM

## 2023-11-02 DIAGNOSIS — I10 PRIMARY HYPERTENSION: Primary | ICD-10-CM

## 2023-11-02 PROCEDURE — 99214 OFFICE O/P EST MOD 30 MIN: CPT | Performed by: NURSE PRACTITIONER

## 2023-11-02 PROCEDURE — 3078F DIAST BP <80 MM HG: CPT | Performed by: NURSE PRACTITIONER

## 2023-11-02 PROCEDURE — 3074F SYST BP LT 130 MM HG: CPT | Performed by: NURSE PRACTITIONER

## 2023-11-02 PROCEDURE — 1036F TOBACCO NON-USER: CPT | Performed by: NURSE PRACTITIONER

## 2023-11-02 RX ORDER — HYDROXYZINE HYDROCHLORIDE 25 MG/1
25 TABLET, FILM COATED ORAL 3 TIMES DAILY PRN
COMMUNITY

## 2023-11-02 NOTE — PATIENT INSTRUCTIONS
Lipid panel to check cholesterol  No other medication changes continue all as prescribed  Will resend Repatha to specialty pharmacy     Goal exercise is 150 minutes/week (30 minutes/day, 5 days/week).   Choose something you enjoy and can commit to.   Strength training is as important as cardio.     Follow up yearly for routine appointment and valve surveillance  Call with questions or concerns

## 2023-11-02 NOTE — PROGRESS NOTES
Primary Care Physician: Kan Santacruz MD  Primary Cardiologist:       Date of Visit: 11/02/2023  1:00 PM EDT  Location of visit:  [facility]   Type of Visit: Follow up             Chief Complaint   Patient presents with    Follow-up     On echo results       HPI / Summary:   Valeri Mtz is a 58 y.o. female  with HTN, HLD, mitral valve disease s/p mitral valve replacement ([place], [place] 9/20/2019), PAF on OAC with Xarelto, CVA in 2015, 2022 while off OAC who returns for routine follow up.    Overall doing well from CV perspective. Voices concern in regards to not having any cholesterol medication since previous appointment. Notes she called with complaints of weakness and muscle aches. She was instructed to stop the crestor and Zetia. She was then to start Repatha however script was denied. Since stopping weakness and muscle aches have completely resolved and had been since about a week after stopping the crestor and zetia.     Denies CP,SOB, palpitations. Fluttering now and then from Afib but nothing sustained.  12 system review is negative except as noted above    Medical History:   Past Medical History:   Diagnosis Date    Old myocardial infarction     History of myocardial infarction    Rectal bleeding 04/18/2023       Social History:   Tobacco Use: Medium Risk (11/2/2023)    Patient History     Smoking Tobacco Use: Former     Smokeless Tobacco Use: Never     Passive Exposure: Not on file       MEDICATIONS:   Current Outpatient Medications   Medication Instructions    ARIPiprazole (ABILIFY) 2 mg, oral, Nightly    diaper,brief,adult,disposable (Briefs, Adult-Extra Large) misc Uses up to 4 daily    diaper,brief,adult,disposable (Briefs, Adult-Extra Large) misc USE UP TO FOUR TIMES DAILY AS NEEDED    diclofenac sodium 1 % kit APPLY SPARINGLY TO THE AFFECTED AREA TWO TO THREE TIMES A DAY AS NEEDED FOR PAIN    DULoxetine (CYMBALTA) 60 mg, oral, Daily    evolocumab (Repatha) 140 mg/mL injection INJECT 140 MG  UNDER THE SKIN EVERY 2 WEEKS.    ferrous sulfate 325 (65 Fe) MG tablet 65 mg of iron, oral, Daily with breakfast    Forteo 20 mcg, subcutaneous    hydrOXYzine HCL (ATARAX) 25 mg, oral, 3 times daily PRN    melatonin 5 mg, oral, Nightly    metoprolol tartrate (LOPRESSOR) 25 mg, oral, 2 times daily    oxybutynin (DITROPAN) 5 mg, oral, 2 times daily    potassium chloride CR 10 mEq ER tablet TAKE 1 TABLET BY MOUTH EVERY DAY *DO NOT CRUSH, CHEW, OR SPLIT    triamcinolone (Kenalog) 0.1 % ointment APPLY TOPICALLY TWICE DAILY UNTIL HEALED    Xarelto 20 mg tablet TAKE 1 TABLET (20 MG) BY MOUTH ONCE DAILY IN THE EVENING. TAKE WITH MEALS. TAKE WITH FOOD.         IMAGING REVIEWED:   Echocardiogram:   Echocardiogram     [facility], [address] [place] [address]  Tel [phone] and Fax [phone]    TRANSTHORACIC ECHOCARDIOGRAM REPORT      Patient Name:     GENARO ROBIN Reading Physician:   [id number] Otilia Krishna MD  Study Date:       5/22/2023        Referring Physician: DELIA BURTON  MRN/PID:          [id number]         PCP:  Accession/Order#: [id number]     Department Location: [facility]  YOB: 1965        Fellow:  Gender:           F                Nurse:               Julian Jacobsen RN  Admit Date:                        Sonographer:         Arianna Rodriguez [facility]  Admission Status: Outpatient       Additional Staff:    Nikki Mojica RN  Height:           165.10 cm        CC Report to:        [id number] Delia Burton NP  Weight:           102.51 kg        Study Type:          Echocardiogram  BSA:              2.08 m2  Blood Pressure: 102 /65 mmHg    Diagnosis/ICD: Z95.2-Presence of prosthetic heart valve  Indication:    hx Mitral valve replaced  Procedure/CPT: Echo Complete w Full [id number]    Patient History:  Smoker:            Former.  Pertinent History: A-Fib, HTN and CVA. Mitral valve repeplaced 2019 27mm  Bioprosthesis.    Study Detail: The following Echo  studies were performed: 2D, M-Mode, Doppler and  color flow. Technically challenging study due to poor acoustic  windows and body habitus. The patient was awake.      PHYSICIAN INTERPRETATION:  Left Ventricle: The left ventricular systolic function is normal, with an estimated ejection fraction of 55-60%. There are no regional wall motion abnormalities. The left ventricular cavity size is normal. Spectral Doppler shows an abnormal pattern of left ventricular diastolic filling.  Left Atrium: The left atrium is moderately dilated.  Right Ventricle: The right ventricle is mildly enlarged. There is normal right ventricular global systolic function.  Right Atrium: The right atrium is mildly dilated.  Aortic Valve: The aortic valve is trileaflet. There is no evidence of aortic valve regurgitation. The peak instantaneous gradient of the aortic valve is 7.8 mmHg.  Mitral Valve: There is a prosthetic mitral valve present. There is mild mitral valve regurgitation. There is a bioprosthetic mitral valve, functioning normally.  Tricuspid Valve: The tricuspid valve is structurally normal. There is mild tricuspid regurgitation.  Pulmonic Valve: The pulmonic valve is structurally normal. There is physiologic pulmonic valve regurgitation.  Pericardium: There is no pericardial effusion noted.  Aorta: The aortic root was not well visualized.      CONCLUSIONS:  1. Left ventricular systolic function is normal with a 55-60% estimated ejection fraction.  2. Spectral Doppler shows an abnormal pattern of left ventricular diastolic filling.  3. The left atrium is moderately dilated.  4. There is a bioprosthetic mitral valve, functioning normally.    QUANTITATIVE DATA SUMMARY:  2D MEASUREMENTS:  Normal Ranges:  Ao Root d:     2.50 cm   (2.0-3.7cm)  LAs:           3.30 cm   (2.7-4.0cm)  IVSd:          0.94 cm   (0.6-1.1cm)  LVPWd:         0.89 cm   (0.6-1.1cm)  LVIDd:         4.94 cm   (3.9-5.9cm)  LVIDs:         3.36 cm  LV Mass Index: 76.1  g/m2  LV % FS        32.0 %    LA VOLUME:  Normal Ranges:  LA Vol A4C:        41.1 ml    (22+/-6mL/m2)  LA Vol A2C:        63.1 ml  LA Vol BP:         50.9 ml  LA Vol Index A4C:  19.7ml/m2  LA Vol Index A2C:  30.3 ml/m2  LA Vol Index BP:   24.4 ml/m2  LA Area A4C:       16.3 cm2  LA Area A2C:       20.2 cm2  LA Major Axis A4C: 5.5 cm  LA Major Axis A2C: 5.5 cm  LA Volume Index:   23.0 ml/m2    RA VOLUME BY A/L METHOD:  Normal Ranges:  RA Vol A4C:        25.9 ml    (8.3-19.5ml)  RA Vol Index A4C:  12.4 ml/m2  RA Area A4C:       11.7 cm2  RA Major Axis A4C: 4.5 cm    M-MODE MEASUREMENTS:  Normal Ranges:  Ao Root: 2.90 cm (2.0-3.7cm)  LAs:     3.70 cm (2.7-4.0cm)    AORTA MEASUREMENTS:  Normal Ranges:  Asc Ao, d: 2.50 cm (2.1-3.4cm)    LV DIASTOLIC FUNCTION:  Normal Ranges:  MV Peak E:        1.61 m/s    (0.7-1.2 m/s)  MV Peak A:        0.79 m/s    (0.42-0.7 m/s)  E/A Ratio:        2.04        (1.0-2.2)  MV e'             0.07 m/s    (>8.0)  MV lateral e'     0.07 m/s  MV medial e'      0.07 m/s  MV A Dur:         127.00 msec  E/e' Ratio:       23.00       (<8.0)  PulmV Sys Fredi:    28.30 cm/s  PulmV Lau Fredi:   41.90 cm/s  PulmV S/D Fredi:    0.70  PulmV A Revs Fredi: 19.50 cm/s  PulmV A Revs Dur: 129.00 msec    MITRAL VALVE:  Normal Ranges:  MV Vmax:    1.84 m/s  (<=1.3m/s)  MV peak P.6 mmHg (<5mmHg)  MV mean PG: 3.7 mmHg  (<2mmHg)  MV DT:      248 msec  (150-240msec)    AORTIC VALVE:  Normal Ranges:  AoV Vmax:      1.40 m/s (<=1.7m/s)  AoV Peak P.8 mmHg (<20mmHg)  LVOT Max Fredi:  1.05 m/s (<=1.1m/s)  LVOT VTI:      25.40 cm  LVOT Diameter: 1.80 cm  (1.8-2.4cm)  AoV Area,Vmax: 1.91 cm2 (2.5-4.5cm2)    AORTIC INSUFFICIENCY:  AI Vmax:       4.48 m/s  AI Half-time:  493 msec  AI Decel Rate: 266.00 cm/s2    RIGHT VENTRICLE:  RV 1   3.4 cm  RV 2   2.3 cm  RV 3   5.8 cm  TAPSE: 18.0 mm  RV s'  0.09 m/s    TRICUSPID VALVE/RVSP:  Normal Ranges:  Peak TR Velocity: 3.09 m/s  RV Syst Pressure: 41.2 mmHg (< 30mmHg)  IVC  Diam:         1.50 cm    PULMONIC VALVE:  Normal Ranges:  PV Max Fredi: 0.9 m/s  (0.6-0.9m/s)  PV Max PG:  3.2 mmHg    Pulmonary Veins:  PulmV A Revs Dur: 129.00 msec  PulmV A Revs Fredi: 19.50 cm/s  PulmV Lau Fredi:   41.90 cm/s  PulmV S/D Fredi:    0.70  PulmV Sys Fredi:    28.30 cm/s      [id number] Otilia Krishna MD  Electronically signed on 5/22/2023 at 4:54:04 PM      Stress Testing: No results found for this or any previous visit from the past 1825 days.    Cardiac Catheterization: No results found for this or any previous visit from the past 1825 days.    Cardiac Scoring: No results found for this or any previous visit from the past 1825 days.    AAA : No results found for this or any previous visit from the past 1825 days.    OTHER: No results found for this or any previous visit from the past 1825 days.      LABS:  CBC with Differential:    Lab Results   Component Value Date    WBC 6.5 04/04/2023    RBC 4.79 04/04/2023    HGB 14.5 04/04/2023    HCT 43.6 04/04/2023     04/04/2023    MCV 91 04/04/2023    MCHC 33.3 04/04/2023    RDW 13.7 04/04/2023    NRBC 0.0 01/30/2023    LYMPHOPCT 29.5 04/04/2023    MONOPCT 9.8 04/04/2023    EOSPCT 3.2 04/04/2023    BASOPCT 1.1 04/04/2023    MONOSABS 0.64 04/04/2023    LYMPHSABS 1.92 04/04/2023    EOSABS 0.21 04/04/2023    BASOSABS 0.07 04/04/2023     CMP:    Lab Results   Component Value Date     04/04/2023    K 3.7 04/04/2023     04/04/2023    CO2 29 04/04/2023    BUN 7 04/04/2023    CREATININE 0.97 04/04/2023    GLUCOSE 92 04/04/2023    PROT 7.1 04/04/2023    CALCIUM 9.5 04/04/2023    BILITOT 0.9 04/04/2023    ALKPHOS 97 04/04/2023    AST 26 04/04/2023    ALT 17 04/04/2023     BMP:    Lab Results   Component Value Date     04/04/2023    K 3.7 04/04/2023     04/04/2023    CO2 29 04/04/2023    BUN 7 04/04/2023    CREATININE 0.97 04/04/2023    CALCIUM 9.5 04/04/2023    GLUCOSE 92 04/04/2023     Magnesium:  Lab Results   Component Value Date    MG  1.91 11/30/2022     Troponin:    Lab Results   Component Value Date    TROPHS 16 11/28/2022    TROPHS 10 11/27/2022    TROPHS 9 11/27/2022     BNP:   Lab Results   Component Value Date    BNP 53 11/28/2022       Lipid Panel:  Lab Results   Component Value Date    HDL 48.4 11/28/2022    CHHDL 5.5 (A) 11/28/2022    VLDL 35 11/28/2022    TRIG 174 (H) 11/28/2022        Lab work and imaging results independently reviewed by me       Vitals reviewed.   Constitutional:       General: Awake.      Appearance: Normal and healthy appearance. Well-developed and not in distress.   Eyes:      General: Lids are normal.      Pupils: Pupils are equal, round, and reactive to light.   HENT:      Nose: Nose normal.    Mouth/Throat:      Lips: Pink.      Mouth: Mucous membranes are moist.   Neck:      Vascular: JVD normal.   Pulmonary:      Effort: Pulmonary effort is normal.      Breath sounds: Normal breath sounds and air entry.   Chest:      Chest wall: Not tender to palpatation.   Cardiovascular:      PMI at left midclavicular line. Normal rate. Regular rhythm. Normal S1. Normal S2.       Murmurs: There is no murmur.   Edema:     Peripheral edema absent.   Abdominal:      General: Bowel sounds are normal.      Palpations: Abdomen is soft.      Tenderness: There is no abdominal tenderness.   Musculoskeletal: Normal range of motion.      Cervical back: Full passive range of motion without pain, normal range of motion and neck supple. Skin:     General: Skin is warm and dry.   Neurological:      General: No focal deficit present.      Mental Status: Alert, oriented to person, place, and time and oriented to person, place and time. Mental status is at baseline.   Psychiatric:         Attention and Perception: Attention and perception normal.         Mood and Affect: Mood and affect normal.         Speech: Speech normal.         Behavior: Behavior normal. Behavior is cooperative.         Thought Content: Thought content normal.        Problem List Items Addressed This Visit             ICD-10-CM    Hypercholesterolemia E78.00     Check Lipid panel  Resend Repatha script to specialty pharmacy  Confirmed intolerance to statin and Zetia with resolution of symptos  Requires pharacological non statin therapy given history of CVA          Relevant Orders    Lipid Panel    S/P mitral valve replacement Z95.2    CVA, old, aphasia I69.320    HTN (hypertension) - Primary I10    Paroxysmal atrial fibrillation (CMS/HCC) I48.0     Reassured patient from a cardiac perspective. Euvolemic on exam.   No Medication changes, continue all as prescribed.   Reinforced the importance of BP monitoring at home, monitor 3-4 times per week   Discussed importance of daily activity and dietary modification  Follow up 1 year or sooner if needed  Encouraged to call with questions or concerns.       MARSHA Heredia       Orders:  Orders Placed This Encounter   Procedures    Lipid Panel         Followup Appts:  Future Appointments   Date Time Provider Department Center   11/21/2023  3:30 PM Kan Santacruz MD [id number] [facility]   3/6/2024  2:00 PM Daiana Velasquez PA-C [id number] [facility]   11/12/2024  1:20 PM MARSHA Hudson [id number] [place]

## 2023-11-03 NOTE — ASSESSMENT & PLAN NOTE
Check Lipid panel  Resend Repatha script to specialty pharmacy  Confirmed intolerance to statin and Zetia with resolution of symptos  Requires pharacological non statin therapy given history of CVA

## 2023-11-21 ENCOUNTER — APPOINTMENT (OUTPATIENT)
Dept: PRIMARY CARE | Facility: CLINIC | Age: 58
End: 2023-11-21
Payer: MEDICARE

## 2023-11-22 RX ORDER — TERIPARATIDE 250 UG/ML
INJECTION, SOLUTION SUBCUTANEOUS
Qty: 2.4 ML | Refills: 6 | Status: CANCELLED | OUTPATIENT
Start: 2023-11-22 | End: 2024-11-21

## 2023-11-24 ENCOUNTER — SPECIALTY PHARMACY (OUTPATIENT)
Dept: PHARMACY | Facility: CLINIC | Age: 58
End: 2023-11-24

## 2023-11-29 ENCOUNTER — PHARMACY VISIT (OUTPATIENT)
Dept: PHARMACY | Facility: CLINIC | Age: 58
End: 2023-11-29
Payer: COMMERCIAL

## 2023-11-29 DIAGNOSIS — M81.0 OSTEOPOROSIS WITHOUT CURRENT PATHOLOGICAL FRACTURE, UNSPECIFIED OSTEOPOROSIS TYPE: Primary | ICD-10-CM

## 2023-11-29 PROCEDURE — RXMED WILLOW AMBULATORY MEDICATION CHARGE

## 2023-11-30 RX ORDER — TERIPARATIDE 250 UG/ML
INJECTION, SOLUTION SUBCUTANEOUS
Qty: 2.4 ML | Refills: 6 | Status: SHIPPED | OUTPATIENT
Start: 2023-11-30 | End: 2024-11-29

## 2023-12-06 PROCEDURE — RXMED WILLOW AMBULATORY MEDICATION CHARGE

## 2023-12-19 ENCOUNTER — SPECIALTY PHARMACY (OUTPATIENT)
Dept: PHARMACY | Facility: CLINIC | Age: 58
End: 2023-12-19

## 2023-12-20 ENCOUNTER — PHARMACY VISIT (OUTPATIENT)
Dept: PHARMACY | Facility: CLINIC | Age: 58
End: 2023-12-20
Payer: COMMERCIAL

## 2023-12-22 ENCOUNTER — OFFICE VISIT (OUTPATIENT)
Dept: PRIMARY CARE | Facility: CLINIC | Age: 58
End: 2023-12-22
Payer: MEDICARE

## 2023-12-22 VITALS
WEIGHT: 239.8 LBS | SYSTOLIC BLOOD PRESSURE: 128 MMHG | BODY MASS INDEX: 39.95 KG/M2 | HEART RATE: 68 BPM | OXYGEN SATURATION: 94 % | HEIGHT: 65 IN | RESPIRATION RATE: 18 BRPM | DIASTOLIC BLOOD PRESSURE: 80 MMHG

## 2023-12-22 DIAGNOSIS — I48.0 PAROXYSMAL ATRIAL FIBRILLATION (MULTI): ICD-10-CM

## 2023-12-22 DIAGNOSIS — Z00.00 ENCOUNTER FOR ANNUAL WELLNESS VISIT (AWV) IN MEDICARE PATIENT: ICD-10-CM

## 2023-12-22 DIAGNOSIS — Z23 INFLUENZA VACCINATION ADMINISTERED AT CURRENT VISIT: ICD-10-CM

## 2023-12-22 PROBLEM — D12.8 TUBULOVILLOUS ADENOMA OF RECTUM: Status: ACTIVE | Noted: 2023-12-22

## 2023-12-22 PROCEDURE — G0008 ADMIN INFLUENZA VIRUS VAC: HCPCS | Performed by: INTERNAL MEDICINE

## 2023-12-22 PROCEDURE — 90686 IIV4 VACC NO PRSV 0.5 ML IM: CPT | Performed by: INTERNAL MEDICINE

## 2023-12-22 PROCEDURE — 3079F DIAST BP 80-89 MM HG: CPT | Performed by: INTERNAL MEDICINE

## 2023-12-22 PROCEDURE — 3074F SYST BP LT 130 MM HG: CPT | Performed by: INTERNAL MEDICINE

## 2023-12-22 PROCEDURE — 1036F TOBACCO NON-USER: CPT | Performed by: INTERNAL MEDICINE

## 2023-12-22 PROCEDURE — 99214 OFFICE O/P EST MOD 30 MIN: CPT | Performed by: INTERNAL MEDICINE

## 2023-12-22 NOTE — PROGRESS NOTES
Patient ID:   Valeri Mtz is a 58 y.o. female with PMH remarkable for Afib (Apixaban & metoprolol tart stopped 1mo prior), polymyalgia rheumatica (prednisone stopped 1mo prior), hx of strokes (ASA + statin stopped 1 mo prior d/t lack of insurance, mitral valve disease s/p mitral valve replacement in [place] who presents to the office today for Follow-up.    HEALTH MAINTENANCE: Annual Medicare Wellness Visit  Smoking: former smoker, quit in   Mammogram (40-75): 23 WNL  Pap smear (21-65): per GYN 23 normal  and negative HPV  Labs: 23  Colonoscopy (45-75): 23 with Dr Justino FRANKLIN (65+, q 2 years): 2/15/23 showed osteoporosis    SOCIAL HISTORY:  Social History     Tobacco Use    Smoking status: Former     Types: Cigarettes     Quit date:      Years since quittin.9    Smokeless tobacco: Never   Substance Use Topics    Alcohol use: Yes     Alcohol/week: 1.0 standard drink of alcohol     Types: 1 Standard drinks or equivalent per week    Drug use: Yes     Types: Marijuana     IMMUNIZATIONS:  Immunization History   Administered Date(s) Administered    Flu vaccine (IIV4), preservative free *Check age/dose* 2023    Moderna COVID-19 vaccine, bivalent, blue cap/gray label *Check age/dose* 2023     REVIEW OF SYSTEMS:  Review of Systems   All other systems reviewed and are negative.    ALLERGIES:  Allergies   Allergen Reactions    Kiwi Other      VITAL SIGNS:  Vitals:    23 1252   BP: 128/80   Pulse: 68   Resp: 18   SpO2: 94%     Physical Exam  Vitals reviewed.   Constitutional:       General: She is not in acute distress.     Appearance: Normal appearance. She is not ill-appearing.   HENT:      Head: Normocephalic and atraumatic.      Right Ear: Tympanic membrane and external ear normal.      Left Ear: Tympanic membrane and external ear normal.      Nose: Nose normal.      Mouth/Throat:      Mouth: Mucous membranes are moist.      Pharynx: Oropharynx is clear.   Eyes:       Conjunctiva/sclera: Conjunctivae normal.      Pupils: Pupils are equal, round, and reactive to light.   Cardiovascular:      Rate and Rhythm: Normal rate and regular rhythm.      Heart sounds: Normal heart sounds. No murmur heard.  Pulmonary:      Effort: Pulmonary effort is normal. No respiratory distress.      Breath sounds: Normal breath sounds. No wheezing.   Abdominal:      General: There is no distension.      Palpations: Abdomen is soft. There is no mass.      Tenderness: There is no abdominal tenderness.   Musculoskeletal:         General: Normal range of motion.      Cervical back: Normal range of motion and neck supple.   Skin:     General: Skin is warm and dry.   Neurological:      General: No focal deficit present.      Mental Status: She is alert and oriented to person, place, and time.      Sensory: No sensory deficit.      Motor: No weakness.      Coordination: Coordination normal.      Gait: Gait normal.   Psychiatric:         Mood and Affect: Mood normal.         Behavior: Behavior normal.       MEDICATIONS:  Current Outpatient Medications on File Prior to Visit   Medication Sig Dispense Refill    ARIPiprazole (Abilify) 2 mg tablet Take 1 tablet (2 mg) by mouth once daily at bedtime.      diaper,brief,adult,disposable (Briefs, Adult-Extra Large) misc Uses up to 4 daily 120 each 0    diaper,brief,adult,disposable (Briefs, Adult-Extra Large) misc USE UP TO FOUR TIMES DAILY AS NEEDED 120 each 0    diclofenac sodium (Voltaren) 1 % gel gel APPLY SPARINGLY TO THE AFFECTED AREA TWO TO THREE TIMES A DAY AS NEEDED FOR PAIN 100 g 0    evolocumab (Repatha) 140 mg/mL injection INJECT 140 MG UNDER THE SKIN EVERY 2 WEEKS. 2 mL 5    ferrous sulfate, 325 mg ferrous sulfate, tablet TAKE 1 TABLET (65 MG OF IRON) BY MOUTH ONCE DAILY WITH A MEAL. 30 tablet 2    hydrOXYzine HCL (Atarax) 25 mg tablet Take 1 tablet (25 mg) by mouth 3 times a day as needed for anxiety.      melatonin 5 mg capsule TAKE 5 MG BY MOUTH ONCE  DAILY AT BEDTIME. 30 capsule 2    metoprolol tartrate (Lopressor) 25 mg tablet TAKE 1 TABLET BY MOUTH TWICE A DAY 60 tablet 2    oxybutynin (Ditropan) 5 mg tablet Take 1 tablet (5 mg) by mouth 2 times a day. 60 tablet 5    potassium chloride CR 10 mEq ER tablet TAKE 1 TABLET BY MOUTH EVERY DAY *DO NOT CRUSH, CHEW, OR SPLIT 90 tablet 0    teriparatide (Forteo) injection INJECT 20 MCG UNDER THE SKIN ONCE DAILY 2.4 mL 6    triamcinolone (Kenalog) 0.1 % ointment APPLY TOPICALLY TWICE DAILY UNTIL HEALED      [DISCONTINUED] Xarelto 20 mg tablet TAKE 1 TABLET (20 MG) BY MOUTH ONCE DAILY IN THE EVENING. TAKE WITH MEALS. TAKE WITH FOOD. 90 tablet 0    DULoxetine (Cymbalta) 60 mg DR capsule Take 1 capsule (60 mg) by mouth once daily. 30 capsule 4    [DISCONTINUED] diclofenac sodium 1 % kit APPLY SPARINGLY TO THE AFFECTED AREA TWO TO THREE TIMES A DAY AS NEEDED FOR PAIN      [DISCONTINUED] ferrous sulfate 325 (65 Fe) MG tablet Take 1 tablet (65 mg of iron) by mouth once daily with a meal. 90 tablet 0    [DISCONTINUED] Forteo injection Inject 0.08 mL (20 mcg) under the skin.      [DISCONTINUED] metoprolol tartrate (Lopressor) 25 mg tablet TAKE 1 TABLET BY MOUTH TWICE A DAY 60 tablet 2     No current facility-administered medications on file prior to visit.      LABORATORY DATA:  Lab Results   Component Value Date    WBC 6.5 04/04/2023    HGB 14.5 04/04/2023    HCT 43.6 04/04/2023     04/04/2023    CHOL 266 (H) 11/28/2022    TRIG 174 (H) 11/28/2022    HDL 48.4 11/28/2022    ALT 17 04/04/2023    AST 26 04/04/2023     04/04/2023    K 3.7 04/04/2023     04/04/2023    CREATININE 0.97 04/04/2023    BUN 7 04/04/2023    CO2 29 04/04/2023    TSH 1.12 04/04/2023    INR 1.0 11/27/2022    HGBA1C 5.2 11/28/2022     ASSESSMENT AND PLAN:  Assessment/Plan   Diagnoses and all orders for this visit:  Encounter for annual wellness visit (AWV) in Medicare patient  Influenza vaccination administered at current visit  -     Flu  vaccine (IIV4) age 6 months and greater, preservative free  Paroxysmal atrial fibrillation (CMS/HCC)  Comments:  - c/w xarelto for stroke prevention  - c/w metoprolol for rate control  Orders:  -     rivaroxaban (Xarelto) 20 mg tablet; TAKE 1 TABLET (20 MG) BY MOUTH ONCE DAILY IN THE EVENING. TAKE WITH MEALS. TAKE WITH FOOD.      --------------------  Written by Lolita Maier RN, acting as a scribe for Dr. Juarez. This note accurately reflects the work and decisions made by Dr. Juarez.     I, Dr. Juarez, attest all medical record entries made by the scribe were under my direction and were personally dictated by me. I have reviewed the chart and agree that the record accurately reflects my performance of the history, physical exam, and assessment and plan.

## 2023-12-30 ENCOUNTER — SPECIALTY PHARMACY (OUTPATIENT)
Dept: PHARMACY | Facility: CLINIC | Age: 58
End: 2023-12-30

## 2023-12-30 PROCEDURE — RXMED WILLOW AMBULATORY MEDICATION CHARGE

## 2024-01-03 ENCOUNTER — PHARMACY VISIT (OUTPATIENT)
Dept: PHARMACY | Facility: CLINIC | Age: 59
End: 2024-01-03
Payer: COMMERCIAL

## 2024-01-15 ENCOUNTER — SPECIALTY PHARMACY (OUTPATIENT)
Dept: PHARMACY | Facility: CLINIC | Age: 59
End: 2024-01-15

## 2024-01-15 PROCEDURE — RXMED WILLOW AMBULATORY MEDICATION CHARGE

## 2024-01-17 ENCOUNTER — PHARMACY VISIT (OUTPATIENT)
Dept: PHARMACY | Facility: CLINIC | Age: 59
End: 2024-01-17
Payer: COMMERCIAL

## 2024-01-17 DIAGNOSIS — R32 URINARY INCONTINENCE, UNSPECIFIED TYPE: ICD-10-CM

## 2024-01-17 DIAGNOSIS — G47.00 INSOMNIA, UNSPECIFIED TYPE: ICD-10-CM

## 2024-01-17 RX ORDER — OXYBUTYNIN CHLORIDE 5 MG/1
5 TABLET ORAL 2 TIMES DAILY
Qty: 180 TABLET | Refills: 0 | Status: SHIPPED | OUTPATIENT
Start: 2024-01-17 | End: 2024-01-31

## 2024-01-17 RX ORDER — MELATONIN 5 MG
5 CAPSULE ORAL NIGHTLY
Qty: 90 CAPSULE | Refills: 0 | Status: SHIPPED | OUTPATIENT
Start: 2024-01-17 | End: 2024-05-20

## 2024-01-21 DIAGNOSIS — E87.6 HYPOKALEMIA: ICD-10-CM

## 2024-01-22 RX ORDER — POTASSIUM CHLORIDE 750 MG/1
TABLET, EXTENDED RELEASE ORAL
Qty: 90 TABLET | Refills: 0 | Status: SHIPPED | OUTPATIENT
Start: 2024-01-22 | End: 2024-04-23

## 2024-01-25 ENCOUNTER — SPECIALTY PHARMACY (OUTPATIENT)
Dept: PHARMACY | Facility: CLINIC | Age: 59
End: 2024-01-25

## 2024-01-27 PROCEDURE — RXMED WILLOW AMBULATORY MEDICATION CHARGE

## 2024-01-31 DIAGNOSIS — R32 URINARY INCONTINENCE, UNSPECIFIED TYPE: ICD-10-CM

## 2024-01-31 RX ORDER — OXYBUTYNIN CHLORIDE 5 MG/1
5 TABLET ORAL 2 TIMES DAILY
Qty: 180 TABLET | Refills: 0 | Status: SHIPPED | OUTPATIENT
Start: 2024-01-31

## 2024-02-05 ENCOUNTER — PHARMACY VISIT (OUTPATIENT)
Dept: PHARMACY | Facility: CLINIC | Age: 59
End: 2024-02-05
Payer: COMMERCIAL

## 2024-02-08 DIAGNOSIS — I10 HYPERTENSION, UNSPECIFIED TYPE: ICD-10-CM

## 2024-02-08 RX ORDER — METOPROLOL TARTRATE 25 MG/1
25 TABLET, FILM COATED ORAL 2 TIMES DAILY
Qty: 180 TABLET | Refills: 1 | Status: SHIPPED | OUTPATIENT
Start: 2024-02-08

## 2024-02-10 PROCEDURE — RXMED WILLOW AMBULATORY MEDICATION CHARGE

## 2024-02-12 DIAGNOSIS — I63.9 CEREBROVASCULAR ACCIDENT (CVA), UNSPECIFIED MECHANISM (MULTI): ICD-10-CM

## 2024-02-12 DIAGNOSIS — E78.00 HYPERCHOLESTEROLEMIA: Primary | ICD-10-CM

## 2024-02-13 RX ORDER — ALIROCUMAB 150 MG/ML
150 INJECTION, SOLUTION SUBCUTANEOUS
Qty: 2 ML | Refills: 11 | Status: SHIPPED | OUTPATIENT
Start: 2024-02-13 | End: 2025-02-12

## 2024-02-14 ENCOUNTER — APPOINTMENT (OUTPATIENT)
Dept: PRIMARY CARE | Facility: CLINIC | Age: 59
End: 2024-02-14
Payer: MEDICARE

## 2024-02-14 PROCEDURE — RXMED WILLOW AMBULATORY MEDICATION CHARGE

## 2024-02-19 ENCOUNTER — SPECIALTY PHARMACY (OUTPATIENT)
Dept: PHARMACY | Facility: CLINIC | Age: 59
End: 2024-02-19

## 2024-02-20 ENCOUNTER — PHARMACY VISIT (OUTPATIENT)
Dept: PHARMACY | Facility: CLINIC | Age: 59
End: 2024-02-20
Payer: COMMERCIAL

## 2024-03-06 ENCOUNTER — APPOINTMENT (OUTPATIENT)
Dept: RHEUMATOLOGY | Facility: CLINIC | Age: 59
End: 2024-03-06
Payer: MEDICARE

## 2024-03-06 ENCOUNTER — SPECIALTY PHARMACY (OUTPATIENT)
Dept: PHARMACY | Facility: CLINIC | Age: 59
End: 2024-03-06

## 2024-03-06 NOTE — PROGRESS NOTES
Unable to reach patient regarding new-start Praluent assessment after 3 attempts.  Will keep reassessment call scheduled for 3 months.

## 2024-03-13 ENCOUNTER — SPECIALTY PHARMACY (OUTPATIENT)
Dept: PHARMACY | Facility: CLINIC | Age: 59
End: 2024-03-13

## 2024-03-13 PROCEDURE — RXMED WILLOW AMBULATORY MEDICATION CHARGE

## 2024-03-15 ENCOUNTER — PHARMACY VISIT (OUTPATIENT)
Dept: PHARMACY | Facility: CLINIC | Age: 59
End: 2024-03-15
Payer: COMMERCIAL

## 2024-03-15 ENCOUNTER — SPECIALTY PHARMACY (OUTPATIENT)
Dept: PHARMACY | Facility: CLINIC | Age: 59
End: 2024-03-15

## 2024-04-03 PROCEDURE — RXMED WILLOW AMBULATORY MEDICATION CHARGE

## 2024-04-05 ENCOUNTER — PHARMACY VISIT (OUTPATIENT)
Dept: PHARMACY | Facility: CLINIC | Age: 59
End: 2024-04-05
Payer: COMMERCIAL

## 2024-04-07 DIAGNOSIS — M54.2 CERVICALGIA: ICD-10-CM

## 2024-04-08 RX ORDER — DICLOFENAC SODIUM 10 MG/G
GEL TOPICAL
Qty: 100 G | Refills: 0 | Status: SHIPPED | OUTPATIENT
Start: 2024-04-08

## 2024-04-15 ENCOUNTER — SPECIALTY PHARMACY (OUTPATIENT)
Dept: PHARMACY | Facility: CLINIC | Age: 59
End: 2024-04-15

## 2024-04-15 PROCEDURE — RXMED WILLOW AMBULATORY MEDICATION CHARGE

## 2024-04-16 ENCOUNTER — PHARMACY VISIT (OUTPATIENT)
Dept: PHARMACY | Facility: CLINIC | Age: 59
End: 2024-04-16
Payer: COMMERCIAL

## 2024-04-18 ENCOUNTER — OFFICE VISIT (OUTPATIENT)
Dept: RHEUMATOLOGY | Facility: CLINIC | Age: 59
End: 2024-04-18
Payer: MEDICARE

## 2024-04-18 ENCOUNTER — LAB (OUTPATIENT)
Dept: LAB | Facility: LAB | Age: 59
End: 2024-04-18
Payer: MEDICARE

## 2024-04-18 VITALS
HEIGHT: 65 IN | SYSTOLIC BLOOD PRESSURE: 112 MMHG | TEMPERATURE: 96.8 F | WEIGHT: 238 LBS | BODY MASS INDEX: 39.65 KG/M2 | DIASTOLIC BLOOD PRESSURE: 71 MMHG | HEART RATE: 67 BPM

## 2024-04-18 DIAGNOSIS — M80.00XD OSTEOPOROSIS WITH CURRENT PATHOLOGICAL FRACTURE WITH ROUTINE HEALING, UNSPECIFIED OSTEOPOROSIS TYPE, SUBSEQUENT ENCOUNTER: Primary | ICD-10-CM

## 2024-04-18 DIAGNOSIS — E66.9 OBESITY (BMI 35.0-39.9 WITHOUT COMORBIDITY): ICD-10-CM

## 2024-04-18 DIAGNOSIS — M87.363: ICD-10-CM

## 2024-04-18 DIAGNOSIS — M80.08XA AGE-RELATED OSTEOPOROSIS WITH CURRENT PATHOLOGICAL FRACTURE, VERTEBRA(E), INITIAL ENCOUNTER FOR FRACTURE (MULTI): ICD-10-CM

## 2024-04-18 DIAGNOSIS — E78.00 HYPERCHOLESTEROLEMIA: ICD-10-CM

## 2024-04-18 DIAGNOSIS — M80.00XD OSTEOPOROSIS WITH CURRENT PATHOLOGICAL FRACTURE WITH ROUTINE HEALING, UNSPECIFIED OSTEOPOROSIS TYPE, SUBSEQUENT ENCOUNTER: ICD-10-CM

## 2024-04-18 DIAGNOSIS — M17.12 PRIMARY OSTEOARTHRITIS OF LEFT KNEE: ICD-10-CM

## 2024-04-18 PROCEDURE — 36415 COLL VENOUS BLD VENIPUNCTURE: CPT

## 2024-04-18 PROCEDURE — 3074F SYST BP LT 130 MM HG: CPT | Performed by: INTERNAL MEDICINE

## 2024-04-18 PROCEDURE — 80061 LIPID PANEL: CPT

## 2024-04-18 PROCEDURE — 84155 ASSAY OF PROTEIN SERUM: CPT

## 2024-04-18 PROCEDURE — 82306 VITAMIN D 25 HYDROXY: CPT

## 2024-04-18 PROCEDURE — 84165 PROTEIN E-PHORESIS SERUM: CPT | Performed by: INTERNAL MEDICINE

## 2024-04-18 PROCEDURE — 3078F DIAST BP <80 MM HG: CPT | Performed by: INTERNAL MEDICINE

## 2024-04-18 PROCEDURE — 80053 COMPREHEN METABOLIC PANEL: CPT

## 2024-04-18 PROCEDURE — 84165 PROTEIN E-PHORESIS SERUM: CPT

## 2024-04-18 PROCEDURE — 85025 COMPLETE CBC W/AUTO DIFF WBC: CPT

## 2024-04-18 PROCEDURE — 99214 OFFICE O/P EST MOD 30 MIN: CPT | Performed by: INTERNAL MEDICINE

## 2024-04-18 PROCEDURE — 99214 OFFICE O/P EST MOD 30 MIN: CPT | Mod: GC | Performed by: INTERNAL MEDICINE

## 2024-04-18 PROCEDURE — 1036F TOBACCO NON-USER: CPT | Performed by: INTERNAL MEDICINE

## 2024-04-18 NOTE — PATIENT INSTRUCTIONS
Continue Forteo x 1 more year  We will dose DXA next year    Please confirm how much vitamin D you are taking after you check the OTC pill at home    Labs today, if any blood work is abnormal, you will receive a call.  \  Otherwise Follow up x 6 mo

## 2024-04-19 LAB
25(OH)D3 SERPL-MCNC: 36 NG/ML (ref 30–100)
ALBUMIN SERPL BCP-MCNC: 4 G/DL (ref 3.4–5)
ALP SERPL-CCNC: 85 U/L (ref 33–110)
ALT SERPL W P-5'-P-CCNC: 14 U/L (ref 7–45)
ANION GAP SERPL CALC-SCNC: 13 MMOL/L (ref 10–20)
AST SERPL W P-5'-P-CCNC: 17 U/L (ref 9–39)
BASOPHILS # BLD AUTO: 0.09 X10*3/UL (ref 0–0.1)
BASOPHILS NFR BLD AUTO: 1.3 %
BILIRUB SERPL-MCNC: 0.5 MG/DL (ref 0–1.2)
BUN SERPL-MCNC: 17 MG/DL (ref 6–23)
CALCIUM SERPL-MCNC: 9.5 MG/DL (ref 8.6–10.6)
CHLORIDE SERPL-SCNC: 104 MMOL/L (ref 98–107)
CHOLEST SERPL-MCNC: 207 MG/DL (ref 0–199)
CHOLESTEROL/HDL RATIO: 4
CO2 SERPL-SCNC: 30 MMOL/L (ref 21–32)
CREAT SERPL-MCNC: 1.09 MG/DL (ref 0.5–1.05)
EGFRCR SERPLBLD CKD-EPI 2021: 59 ML/MIN/1.73M*2
EOSINOPHIL # BLD AUTO: 0.33 X10*3/UL (ref 0–0.7)
EOSINOPHIL NFR BLD AUTO: 4.6 %
ERYTHROCYTE [DISTWIDTH] IN BLOOD BY AUTOMATED COUNT: 13 % (ref 11.5–14.5)
GLUCOSE SERPL-MCNC: 80 MG/DL (ref 74–99)
HCT VFR BLD AUTO: 44.1 % (ref 36–46)
HDLC SERPL-MCNC: 51.5 MG/DL
HGB BLD-MCNC: 14.6 G/DL (ref 12–16)
IMM GRANULOCYTES # BLD AUTO: 0.02 X10*3/UL (ref 0–0.7)
IMM GRANULOCYTES NFR BLD AUTO: 0.3 % (ref 0–0.9)
LDLC SERPL CALC-MCNC: 92 MG/DL
LYMPHOCYTES # BLD AUTO: 2.74 X10*3/UL (ref 1.2–4.8)
LYMPHOCYTES NFR BLD AUTO: 38.5 %
MCH RBC QN AUTO: 31.9 PG (ref 26–34)
MCHC RBC AUTO-ENTMCNC: 33.1 G/DL (ref 32–36)
MCV RBC AUTO: 97 FL (ref 80–100)
MONOCYTES # BLD AUTO: 0.66 X10*3/UL (ref 0.1–1)
MONOCYTES NFR BLD AUTO: 9.3 %
NEUTROPHILS # BLD AUTO: 3.27 X10*3/UL (ref 1.2–7.7)
NEUTROPHILS NFR BLD AUTO: 46 %
NON HDL CHOLESTEROL: 156 MG/DL (ref 0–149)
NRBC BLD-RTO: 0 /100 WBCS (ref 0–0)
PLATELET # BLD AUTO: 216 X10*3/UL (ref 150–450)
POTASSIUM SERPL-SCNC: 4.3 MMOL/L (ref 3.5–5.3)
PROT SERPL-MCNC: 6.5 G/DL (ref 6.4–8.2)
PROT SERPL-MCNC: 6.5 G/DL (ref 6.4–8.2)
RBC # BLD AUTO: 4.57 X10*6/UL (ref 4–5.2)
SODIUM SERPL-SCNC: 143 MMOL/L (ref 136–145)
TRIGL SERPL-MCNC: 318 MG/DL (ref 0–149)
VLDL: 64 MG/DL (ref 0–40)
WBC # BLD AUTO: 7.1 X10*3/UL (ref 4.4–11.3)
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
## 2024-04-22 LAB
ALBUMIN: 4 G/DL (ref 3.4–5)
ALPHA 1 GLOBULIN: 0.3 G/DL (ref 0.2–0.6)
ALPHA 2 GLOBULIN: 0.6 G/DL (ref 0.4–1.1)
BETA GLOBULIN: 0.8 G/DL (ref 0.5–1.2)
GAMMA GLOBULIN: 0.7 G/DL (ref 0.5–1.4)
PATH REVIEW-SERUM PROTEIN ELECTROPHORESIS: NORMAL
PROTEIN ELECTROPHORESIS COMMENT: NORMAL

## 2024-04-23 DIAGNOSIS — E87.6 HYPOKALEMIA: ICD-10-CM

## 2024-04-23 RX ORDER — POTASSIUM CHLORIDE 750 MG/1
TABLET, EXTENDED RELEASE ORAL
Qty: 90 TABLET | Refills: 0 | Status: SHIPPED | OUTPATIENT
Start: 2024-04-23

## 2024-04-29 ENCOUNTER — SPECIALTY PHARMACY (OUTPATIENT)
Dept: PHARMACY | Facility: CLINIC | Age: 59
End: 2024-04-29

## 2024-04-29 PROCEDURE — RXMED WILLOW AMBULATORY MEDICATION CHARGE

## 2024-04-29 NOTE — PROGRESS NOTES
[facility] Clinical Note    Valeri Mtz is a 58 y.o. female, who is on the specialty pharmacy service of: Multiple active episodes found.  Valeri Mtz is taking: Forteo and Praluent.     Valeri was contacted on 4/29/2024.    Refer to the encounter summary report for documentation details about patient counseling and education.      Impression/Plan  Is patient high risk? (potential patients:  pregnancy, geriatric, pediatric)   none  Is laboratory follow-up needed?  Per MD  Is a clinical intervention needed?  None  Next assessment date?  Forteo 6 10/26/24; Praluent 7/28/24  Additional comments: 1 missed dose of forteo - discussed with pt to still discard 28 days from first use, not at 28 doses due to missed dose. Pt indicated understanding.     Scheduled Praluent refill to ship to pt, Forteo not yet ready to refill    Medication Adherence  The importance of adherence was discussed with the patient and they were advised to take the medication as prescribed by their provider. Valeri was encouraged to call her physician's office if they have a question regarding a missed dose.     QOL/Patient Satisfaction  Rate your quality of life on scale of 1-10: 8  Rate your satisfaction with  Specialty Pharmacy on scale of 1-10: 10 - Completely satisfied      Patient advised to contact the pharmacy if there are any changes to her medication list, including prescriptions, OTC medications, herbal products, or supplements. Patient was advised of [facility]’s dispensing process, refill timeline, contact information ([phone]), and patient management follow up. Patient confirmed understanding of education conducted during assessment. All patient questions and concerns were addressed to the best of my ability. Patient was encouraged to contact the specialty pharmacy with any questions or concerns.    Confirmed follow-up outreaches are properly scheduled. Reviewed  goals of therapy in the program targets.    Wojciech Altamirano, RonanD

## 2024-04-30 ENCOUNTER — EVALUATION (OUTPATIENT)
Dept: PHYSICAL THERAPY | Facility: HOSPITAL | Age: 59
End: 2024-04-30
Payer: MEDICARE

## 2024-04-30 ENCOUNTER — PHARMACY VISIT (OUTPATIENT)
Dept: PHARMACY | Facility: CLINIC | Age: 59
End: 2024-04-30
Payer: COMMERCIAL

## 2024-04-30 DIAGNOSIS — M25.562 CHRONIC PAIN OF LEFT KNEE: Primary | ICD-10-CM

## 2024-04-30 DIAGNOSIS — G89.29 CHRONIC PAIN OF LEFT KNEE: Primary | ICD-10-CM

## 2024-04-30 DIAGNOSIS — M17.12 PRIMARY OSTEOARTHRITIS OF LEFT KNEE: ICD-10-CM

## 2024-04-30 PROBLEM — R26.2 DIFFICULTY WALKING: Status: ACTIVE | Noted: 2024-04-30

## 2024-04-30 PROCEDURE — 97162 PT EVAL MOD COMPLEX 30 MIN: CPT | Mod: GP

## 2024-04-30 ASSESSMENT — PATIENT HEALTH QUESTIONNAIRE - PHQ9
SUM OF ALL RESPONSES TO PHQ9 QUESTIONS 1 AND 2: 0
1. LITTLE INTEREST OR PLEASURE IN DOING THINGS: NOT AT ALL
2. FEELING DOWN, DEPRESSED OR HOPELESS: NOT AT ALL

## 2024-04-30 ASSESSMENT — COLUMBIA-SUICIDE SEVERITY RATING SCALE - C-SSRS
6. HAVE YOU EVER DONE ANYTHING, STARTED TO DO ANYTHING, OR PREPARED TO DO ANYTHING TO END YOUR LIFE?: NO
1. IN THE PAST MONTH, HAVE YOU WISHED YOU WERE DEAD OR WISHED YOU COULD GO TO SLEEP AND NOT WAKE UP?: NO
2. HAVE YOU ACTUALLY HAD ANY THOUGHTS OF KILLING YOURSELF?: NO

## 2024-04-30 ASSESSMENT — PAIN SCALES - GENERAL: PAINLEVEL_OUTOF10: 4

## 2024-04-30 ASSESSMENT — PAIN - FUNCTIONAL ASSESSMENT: PAIN_FUNCTIONAL_ASSESSMENT: 0-10

## 2024-04-30 ASSESSMENT — ENCOUNTER SYMPTOMS
LOSS OF SENSATION IN FEET: 0
OCCASIONAL FEELINGS OF UNSTEADINESS: 1
DEPRESSION: 0

## 2024-04-30 NOTE — PROGRESS NOTES
Physical Therapy    Physical Therapy Evaluation    Patient Name: Valeri Mtz  MRN: [id number]  Today's Date: 4/30/2024  Time Calculation  Start Time: 1300  Stop Time: 1340  Time Calculation (min): 40 min    Assessment  PT Assessment Results: Decreased strength, Decreased endurance, Decreased mobility, Obesity, Pain  Rehab Prognosis: Good  Assessment Comment: Patient presenting with chronic OA related pain and reduced performance in functional mobility testing. She will benefit from skilled PT to maximize function and establish HEP.    Plan  Treatment/Interventions: Education/ Instruction, Gait training, Neuromuscular re-education, Therapeutic activities, Therapeutic exercises  PT Plan: Skilled PT  PT Frequency: 2 times per week  Duration: 4 wks  Certification Period Start Date: 04/30/24  Certification Period End Date: 05/30/24  Rehab Potential: Good    Current Problem  1. Chronic pain of left knee        2. Primary osteoarthritis of left knee  Referral to Physical Therapy          Subjective   General:  General  Reason for Referral: L knee pain and difficulty walking  Referred By: Dr. Vogel  Past Medical History Relevant to Rehab: Patient with multiyear h/o L knee pain interfering with ambulatory ability and functional mobility. She was referred by Rheumatology to improve functional mobility.  General Comment: Patient enthusiastic regarding therapy and has also joined the [facility].  Precautions:  Precautions  STEADI Fall Risk Score (The score of 4 or more indicates an increased risk of falling): 6     Pain:  Pain Assessment: 0-10  Pain Score: 4  Pain Location: Knee  Pain Orientation: Left, Outer  Effect of Pain on Daily Activities: Decreases ambulatory ability.  Home Living:   Independent    Prior Function Per Pt/Caregiver Report:   Uses Rolator per physician.    Objective   Posture:   WFL    Range of Motion:  Range of Motion Comments: L knee AROM 0-130    Strength:  Strength Comments: 4+/5 L thigh ms. 4/5  bilateral hip abductors.    Flexibility:   WFL    Palpation:  Palpation Comment: NTTP L knee    Special Tests:  Special Tests Comment: negative ligamentous laxity tests L knee.    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    Gait   With Rolator (per physician recommendation.    Outcome Measures:  TUG 19 sec  5x STS 23 sec     OP EDUCATION:  Outpatient Education  Individual(s) Educated: Patient  Education Provided: Anatomy, Body Mechanics  Risk and Benefits Discussed with Patient/Caregiver/Other: yes  Patient Response to Education: Patient/Caregiver Verbalized Understanding of Information    Goals:  Active       PT Problem       The patient will ambulate 10 minutes continuously without pain increasing to greater than 1/10 to allow return to required community ambulation tasks.        Start:  04/30/24    Expected End:  05/30/24            The patient will improve their performance in the 5 time sit to stand test to < 15 sec to demonstrate increased balance and LE strength required for safe functional transfers and gait.        Start:  04/30/24    Expected End:  05/30/24            The patient will decrease their time in the timed up and go to <15 seconds to demonstrate improved balance and functional ambulatory ability.        Start:  04/30/24    Expected End:  05/30/24            The patient will report a reduction in pain to <=1/10 to allow them to return to required/desired ADL activities.        Start:  04/30/24    Expected End:  05/30/24            Increase hip abduction and thigh MMT to 5/5       Start:  04/30/24    Expected  End:  05/30/24

## 2024-05-01 ENCOUNTER — SPECIALTY PHARMACY (OUTPATIENT)
Dept: PHARMACY | Facility: CLINIC | Age: 59
End: 2024-05-01

## 2024-05-07 ENCOUNTER — APPOINTMENT (OUTPATIENT)
Dept: PHYSICAL THERAPY | Facility: HOSPITAL | Age: 59
End: 2024-05-07
Payer: MEDICARE

## 2024-05-08 PROCEDURE — RXMED WILLOW AMBULATORY MEDICATION CHARGE

## 2024-05-09 ENCOUNTER — TREATMENT (OUTPATIENT)
Dept: PHYSICAL THERAPY | Facility: HOSPITAL | Age: 59
End: 2024-05-09
Payer: MEDICARE

## 2024-05-09 DIAGNOSIS — M25.562 CHRONIC PAIN OF LEFT KNEE: ICD-10-CM

## 2024-05-09 DIAGNOSIS — G89.29 CHRONIC PAIN OF LEFT KNEE: ICD-10-CM

## 2024-05-09 PROCEDURE — 97110 THERAPEUTIC EXERCISES: CPT | Mod: GP,CQ

## 2024-05-09 ASSESSMENT — PAIN SCALES - GENERAL: PAINLEVEL_OUTOF10: 8

## 2024-05-09 ASSESSMENT — PAIN - FUNCTIONAL ASSESSMENT: PAIN_FUNCTIONAL_ASSESSMENT: 0-10

## 2024-05-09 NOTE — PROGRESS NOTES
Physical Therapy Treatment    Patient Name: Valeri Mtz  MRN: [id number]  Today's Date: 5/9/2024  Time Calculation  Start Time: 1254  Stop Time: 1336  Time Calculation (min): 42 min        PT Therapeutic Procedures Time Entry  Therapeutic Exercise Time Entry: 42                Insurance:  Visit number: 2 of 8  Authorization info: 20 authorized   Insurance Type: Medicare    Certification Period Start Date: 04/30/24  Certification Period End Date: 05/30/24       Current Problem  1. Chronic pain of left knee  Follow Up In Physical Therapy          General  Reason for Referral: L knee pain and difficulty walking  Referred By: Dr. Vogel      Subjective   Current Condition:   Same  Arrives to session using rollator, states anytime she leaves her apartment she will take it with her. Patient reports she thinks the cooler weather is causing her to have increased pain in B hips and B knees. Patient states one of her goals is to be able to walk from her apartment to the [facility].     Patient arrives late to session     Performing HEP?: No- states she was not given one. M/W/F she does go to the [facility] for the pool.     Precautions  Precautions  Medical Precautions: Fall precautions  Pain  Pain Assessment: 0-10  Pain Score: 8  Pain Location: Knee  Pain Orientation: Left      Treatments:    Therapeutic Exercise  Therapeutic Exercise Performed: Yes     Physiostep x10 min for ROM/conditioning, dorsey B knee flexion and B hip ext 2x10, supine bridges 2x10, SLR 2x10, side lying clamshells R/L 3x10, seated EOB hamstring stretch 4x20 sec R/L, STS with no UE support x10, standing calf raises 2x10, hip abd machine 40# 3x10      EDUCATION:   Individual(s) Educated: Patient   Education Provided: Body Mechanics/HEP  Handout(s) Provided: Scanned into chart  Home Program: Provided today   Risk and Benefits Discussed with Patient/Caregiver/Other: Yes   Patient/Caregiver Demonstrated Understanding: Yes   Patient Response to Education:  Patient/Caregiver verbalized understanding of information and Patient/Caregiver performed return demonstration of exercises/activities    Assessment:   Patient was able to performed modest beginning to rehab program well. Reports tightness in B knees throughout, but able to tolerate all exercises instructed on.       Plan:  Continue with POC and as per patient tolerated to improve ability to complete functional tasks  Frequency: 2 x Week  Duration: 4 Weeks        Access Code: [email or web address]  URL: [email or web address]  Date: 05/09/2024  Prepared by: Maura Baires    Exercises  - Supine Bridge  - 1 x daily - 7 x weekly - 3 sets - 10 reps  - Clamshell  - 1 x daily - 7 x weekly - 3 sets - 10 reps  - Standing Knee Flexion AROM with Chair Support  - 1 x daily - 7 x weekly - 3 sets - 10 reps  - Standing Hip Extension with Counter Support  - 1 x daily - 7 x weekly - 3 sets - 10 reps       Goals:  Active       PT Problem       The patient will ambulate 10 minutes continuously without pain increasing to greater than 1/10 to allow return to required community ambulation tasks.        Start:  04/30/24    Expected End:  05/30/24            The patient will improve their performance in the 5 time sit to stand test to < 15 sec to demonstrate increased balance and LE strength required for safe functional transfers and gait.        Start:  04/30/24    Expected End:  05/30/24            The patient will decrease their time in the timed up and go to <15 seconds to demonstrate improved balance and functional ambulatory ability.        Start:  04/30/24    Expected End:  05/30/24            The patient will report a reduction in pain to <=1/10 to allow them to return to required/desired ADL activities.        Start:  04/30/24    Expected End:  05/30/24            Increase hip abduction and thigh MMT to 5/5       Start:  04/30/24    Expected End:  05/30/24                 Maura Baires, PTA

## 2024-05-13 ENCOUNTER — PHARMACY VISIT (OUTPATIENT)
Dept: PHARMACY | Facility: CLINIC | Age: 59
End: 2024-05-13
Payer: COMMERCIAL

## 2024-05-14 ENCOUNTER — TREATMENT (OUTPATIENT)
Dept: PHYSICAL THERAPY | Facility: HOSPITAL | Age: 59
End: 2024-05-14
Payer: MEDICARE

## 2024-05-14 DIAGNOSIS — G89.29 CHRONIC PAIN OF LEFT KNEE: ICD-10-CM

## 2024-05-14 DIAGNOSIS — M25.562 CHRONIC PAIN OF LEFT KNEE: ICD-10-CM

## 2024-05-14 PROCEDURE — 97110 THERAPEUTIC EXERCISES: CPT | Mod: GP,CQ

## 2024-05-14 ASSESSMENT — PAIN SCALES - GENERAL: PAINLEVEL_OUTOF10: 5 - MODERATE PAIN

## 2024-05-14 ASSESSMENT — PAIN - FUNCTIONAL ASSESSMENT: PAIN_FUNCTIONAL_ASSESSMENT: 0-10

## 2024-05-14 NOTE — PROGRESS NOTES
Physical Therapy Treatment    Patient Name: Valeri Mtz  MRN: [id number]  Today's Date: 5/14/2024  Time Calculation  Start Time: 1250  Stop Time: 1333  Time Calculation (min): 43 min        PT Therapeutic Procedures Time Entry  Therapeutic Exercise Time Entry: 43                Insurance:  Visit number: 3 of 8  Authorization info: 20 authorized   Insurance Type: Medicare     Certification Period Start Date: 04/30/24  Certification Period End Date: 05/30/24       Current Problem  1. Chronic pain of left knee  Follow Up In Physical Therapy          General  Reason for Referral: L knee pain and difficulty walking  Referred By: Dr. Vogel    Subjective   Current Condition:   Same  Patient reports she was a little sore after previous session, but tolerable. Not amb with rollator today as she forgot it and did not have time to go back into apartment.     Patient arrives a little late to session    Performing HEP?: Partially    Precautions  Precautions  Medical Precautions: Fall precautions  Pain  Pain Assessment: 0-10  Pain Score: 5 - Moderate pain  Pain Location: Knee  Pain Orientation: Left      Treatments:    Therapeutic Exercise  Therapeutic Exercise Performed: Yes     Physiostep x10 min for ROM/conditioning, dorsey B knee flexion and B hip ext 3x10, supine bridges 3x10, SLR 3x10, side lying clamshells R/L 3x10, seated EOB hamstring stretch 4x20 sec R/L, standing calf raises 3x10      EDUCATION:   Individual(s) Educated: Patient   Education Provided: Body Mechanics/Techniques   Handout(s) Provided: Scanned into chart  Home Program: In previous notes  Risk and Benefits Discussed with Patient/Caregiver/Other: Yes   Patient/Caregiver Demonstrated Understanding: Yes   Patient Response to Education: Patient/Caregiver verbalized understanding of information and Patient/Caregiver performed return demonstration of exercises/activities    Assessment:   Patient tolerate increased sets well with only c/o fatigue.  Requires cues for correct technique to ensure eliciting accurate muscle response.         Plan:  Continue with POC and as per patient tolerated to improve ability to complete functional tasks  Frequency: 2 x Week  Duration: 4 Weeks       Goals:  Active       PT Problem       The patient will ambulate 10 minutes continuously without pain increasing to greater than 1/10 to allow return to required community ambulation tasks.        Start:  04/30/24    Expected End:  05/30/24            The patient will improve their performance in the 5 time sit to stand test to < 15 sec to demonstrate increased balance and LE strength required for safe functional transfers and gait.        Start:  04/30/24    Expected End:  05/30/24            The patient will decrease their time in the timed up and go to <15 seconds to demonstrate improved balance and functional ambulatory ability.        Start:  04/30/24    Expected End:  05/30/24            The patient will report a reduction in pain to <=1/10 to allow them to return to required/desired ADL activities.        Start:  04/30/24    Expected End:  05/30/24            Increase hip abduction and thigh MMT to 5/5       Start:  04/30/24    Expected End:  05/30/24                 Maura Baires, PTA

## 2024-05-16 ENCOUNTER — TREATMENT (OUTPATIENT)
Dept: PHYSICAL THERAPY | Facility: HOSPITAL | Age: 59
End: 2024-05-16
Payer: MEDICARE

## 2024-05-16 DIAGNOSIS — G89.29 CHRONIC PAIN OF LEFT KNEE: ICD-10-CM

## 2024-05-16 DIAGNOSIS — M25.562 CHRONIC PAIN OF LEFT KNEE: ICD-10-CM

## 2024-05-16 PROCEDURE — 97110 THERAPEUTIC EXERCISES: CPT | Mod: GP

## 2024-05-16 ASSESSMENT — PAIN - FUNCTIONAL ASSESSMENT: PAIN_FUNCTIONAL_ASSESSMENT: 0-10

## 2024-05-16 ASSESSMENT — PAIN SCALES - GENERAL: PAINLEVEL_OUTOF10: 5 - MODERATE PAIN

## 2024-05-16 NOTE — PROGRESS NOTES
Physical Therapy    Physical Therapy Treatment    Patient Name: Valeri Mtz  MRN: [id number]    Today's Date: 5/16/2024  Time Calculation  Start Time: 1255  Stop Time: 1345  Time Calculation (min): 50 min     PT Therapeutic Procedures Time Entry  Therapeutic Exercise Time Entry: 50     Assessment:  PT Assessment  Assessment Comment: Able to tlerate some limited closed chain work today.    Plan:  OP PT Plan  PT Plan: Skilled PT (4/8)    Current Problem  1. Chronic pain of left knee  Follow Up In Physical Therapy          General  PT  Visit  PT Received On: 05/16/24  General  General Comment: Ready to resume sessions. No significant changes to report.    Subjective    Precautions  Precautions  Medical Precautions: Fall precautions       Pain  Pain Assessment  Pain Assessment: 0-10  Pain Score: 5 - Moderate pain  Pain Location: Hip  Pain Orientation: Right, Left    Objective     Activity Tolerance:  Activity Tolerance  Activity Tolerance Comments: Improving    Treatments:  Therapeutic Exercise  Therapeutic Exercise Performed: Yes  Physiostep x10 min for ROM/conditioning, dorsey B knee flexion and B hip ext 3x10x2#, supine bridges 3x10, side lying clamshells R/L 3x10, seated EOB hamstring stretch 3x30 sec R/L, pre stretch standing calf raises 3x10, bilateral 4 inch lateral step ups in // bars.     OP EDUCATION:       Goals:  Active       PT Problem       The patient will ambulate 10 minutes continuously without pain increasing to greater than 1/10 to allow return to required community ambulation tasks.        Start:  04/30/24    Expected End:  05/30/24            The patient will improve their performance in the 5 time sit to stand test to < 15 sec to demonstrate increased balance and LE strength required for safe functional transfers and gait.        Start:  04/30/24    Expected End:  05/30/24            The patient will decrease their time in the timed up and go to <15 seconds to demonstrate improved balance and  functional ambulatory ability.        Start:  04/30/24    Expected End:  05/30/24            The patient will report a reduction in pain to <=1/10 to allow them to return to required/desired ADL activities.        Start:  04/30/24    Expected End:  05/30/24            Increase hip abduction and thigh MMT to 5/5       Start:  04/30/24    Expected End:  05/30/24

## 2024-05-19 DIAGNOSIS — G47.00 INSOMNIA, UNSPECIFIED TYPE: ICD-10-CM

## 2024-05-20 RX ORDER — MELATONIN 5 MG
5 CAPSULE ORAL NIGHTLY
Qty: 30 CAPSULE | Refills: 2 | Status: SHIPPED | OUTPATIENT
Start: 2024-05-20

## 2024-05-24 ENCOUNTER — TREATMENT (OUTPATIENT)
Dept: PHYSICAL THERAPY | Facility: HOSPITAL | Age: 59
End: 2024-05-24
Payer: MEDICARE

## 2024-05-24 DIAGNOSIS — M25.562 CHRONIC PAIN OF LEFT KNEE: ICD-10-CM

## 2024-05-24 DIAGNOSIS — G89.29 CHRONIC PAIN OF LEFT KNEE: ICD-10-CM

## 2024-05-24 PROCEDURE — 97110 THERAPEUTIC EXERCISES: CPT | Mod: GP,CQ

## 2024-05-24 ASSESSMENT — PAIN SCALES - GENERAL: PAINLEVEL_OUTOF10: 4

## 2024-05-24 ASSESSMENT — PAIN - FUNCTIONAL ASSESSMENT: PAIN_FUNCTIONAL_ASSESSMENT: 0-10

## 2024-05-24 NOTE — PROGRESS NOTES
Physical Therapy Treatment    Patient Name: Valeri Mtz  MRN: [id number]  Today's Date: 5/24/2024  Time Calculation  Start Time: 1054  Stop Time: 1133  Time Calculation (min): 39 min        PT Therapeutic Procedures Time Entry  Therapeutic Exercise Time Entry: 39                Insurance:  Visit number: 5 of 8  Authorization info: 20 authorized   Insurance Type: Medicare          Current Problem  1. Chronic pain of left knee  Follow Up In Physical Therapy          General  Referred By: Dr. Vogel      Subjective   Current Condition:   Better  Patient reports feeling some soreness in her knees/hips today. Noticing some small improvements     Performing HEP?: Partially    Precautions  Precautions  Medical Precautions: Fall precautions  Pain  Pain Assessment: 0-10  Pain Score: 4  Pain Location: Hip  Pain Orientation: Right, Left          Treatments:    Therapeutic Exercise  Therapeutic Exercise Performed: Yes    Physiostep x10 min for ROM/conditioning, dorsey B knee flexion and B hip ext 3x10x2#, supine bridges 3x10, side lying clamshells R/L 3x10, SLR 3x10, seated EOB hamstring stretch 3x30 sec R/L, pre stretch standing calf raises 3x10, bilateral 4 inch lateral step ups in // bars.       EDUCATION:   Individual(s) Educated: Patient   Education Provided: Body Mechanics   Handout(s) Provided: Scanned into chart  Home Program: In previous notes  Risk and Benefits Discussed with Patient/Caregiver/Other: Yes   Patient/Caregiver Demonstrated Understanding: Yes   Patient Response to Education: Patient/Caregiver verbalized understanding of information and Patient/Caregiver performed return demonstration of exercises/activities    Assessment:   Patient demonstrated good tolerance to all strengthening exercises with no complaints throughout other than muscle fatigue. Making fair progress         Plan:  Continue with POC and as per patient tolerated to improve ability to complete functional tasks  Frequency: 2 x  Week  Duration: 4 Weeks       Goals:  Active       PT Problem       The patient will ambulate 10 minutes continuously without pain increasing to greater than 1/10 to allow return to required community ambulation tasks.        Start:  04/30/24    Expected End:  05/30/24            The patient will improve their performance in the 5 time sit to stand test to < 15 sec to demonstrate increased balance and LE strength required for safe functional transfers and gait.        Start:  04/30/24    Expected End:  05/30/24            The patient will decrease their time in the timed up and go to <15 seconds to demonstrate improved balance and functional ambulatory ability.        Start:  04/30/24    Expected End:  05/30/24            The patient will report a reduction in pain to <=1/10 to allow them to return to required/desired ADL activities.        Start:  04/30/24    Expected End:  05/30/24            Increase hip abduction and thigh MMT to 5/5       Start:  04/30/24    Expected End:  05/30/24                 Maura Baires, PTA

## 2024-05-28 ENCOUNTER — TREATMENT (OUTPATIENT)
Dept: PHYSICAL THERAPY | Facility: HOSPITAL | Age: 59
End: 2024-05-28
Payer: MEDICARE

## 2024-05-28 DIAGNOSIS — M25.562 CHRONIC PAIN OF LEFT KNEE: ICD-10-CM

## 2024-05-28 DIAGNOSIS — G89.29 CHRONIC PAIN OF LEFT KNEE: ICD-10-CM

## 2024-05-28 PROCEDURE — 97110 THERAPEUTIC EXERCISES: CPT | Mod: GP,CQ

## 2024-05-28 ASSESSMENT — PAIN SCALES - GENERAL: PAINLEVEL_OUTOF10: 5 - MODERATE PAIN

## 2024-05-28 ASSESSMENT — PAIN - FUNCTIONAL ASSESSMENT: PAIN_FUNCTIONAL_ASSESSMENT: 0-10

## 2024-05-28 NOTE — PROGRESS NOTES
Physical Therapy Treatment    Patient Name: Valeri Mtz  MRN: [id number]  Today's Date: 5/28/2024  Time Calculation  Start Time: 1345  Stop Time: 1424  Time Calculation (min): 39 min        PT Therapeutic Procedures Time Entry  Therapeutic Exercise Time Entry: 39                Insurance:  Visit number: 6 of 8  Authorization info: 20 authorized   Insurance Type: Medicare           Current Problem  1. Chronic pain of left knee  Follow Up In Physical Therapy          General  Reason for Referral: L knee pain and difficulty walking  Referred By: Dr. Vogel      Subjective   Current Condition:   Same  Patient reports she was unable to fit rollator in her friends car, but feeling a bit more steady when walking even without out.     Performing HEP?: Partially    Precautions  Precautions  Medical Precautions: Fall precautions  Pain  Pain Assessment: 0-10  Pain Score: 5 - Moderate pain  Pain Location: Hip  Pain Orientation: Right, Left        Treatments:    Therapeutic Exercise  Therapeutic Exercise Performed: Yes     Physiostep x10 min for ROM/conditioning, dorsey B knee flexion and B hip ext 3x10x2#, supine bridges 3x10, side lying hip abd R/L 3x10, SLR 3x10, seated EOB hamstring stretch 3x30 sec R/L, pre stretch standing calf raises 3x10, bilateral 5 inch lateral step ups in // bars 3x10, hip abd machine 40# 3x10       EDUCATION:   Individual(s) Educated: Patient   Education Provided: Body Mechanics/Techniques/HEP  Handout(s) Provided: Scanned into chart  Home Program: In previous notes   Risk and Benefits Discussed with Patient/Caregiver/Other: Yes   Patient/Caregiver Demonstrated Understanding: Yes   Patient Response to Education: Patient/Caregiver verbalized understanding of information and Patient/Caregiver performed return demonstration of exercises/activities    Assessment:   Patient not having changes in pain levels, but is demonstrating improvement in strength noted by improved techniques and tolerance  to exercises.         Plan:  Continue with POC and as per patient tolerated to improve ability to complete functional tasks  Frequency: 2 x Week  Duration: 4 Weeks       Goals:  Active       PT Problem       The patient will ambulate 10 minutes continuously without pain increasing to greater than 1/10 to allow return to required community ambulation tasks.        Start:  04/30/24    Expected End:  05/30/24            The patient will improve their performance in the 5 time sit to stand test to < 15 sec to demonstrate increased balance and LE strength required for safe functional transfers and gait.        Start:  04/30/24    Expected End:  05/30/24            The patient will decrease their time in the timed up and go to <15 seconds to demonstrate improved balance and functional ambulatory ability.        Start:  04/30/24    Expected End:  05/30/24            The patient will report a reduction in pain to <=1/10 to allow them to return to required/desired ADL activities.        Start:  04/30/24    Expected End:  05/30/24            Increase hip abduction and thigh MMT to 5/5       Start:  04/30/24    Expected End:  05/30/24                 Maura Baires, PTA

## 2024-05-30 ENCOUNTER — TREATMENT (OUTPATIENT)
Dept: PHYSICAL THERAPY | Facility: HOSPITAL | Age: 59
End: 2024-05-30
Payer: MEDICARE

## 2024-05-30 DIAGNOSIS — M25.562 CHRONIC PAIN OF LEFT KNEE: ICD-10-CM

## 2024-05-30 DIAGNOSIS — G89.29 CHRONIC PAIN OF LEFT KNEE: ICD-10-CM

## 2024-05-30 PROBLEM — E66.9 OBESITY (BMI 35.0-39.9 WITHOUT COMORBIDITY): Status: ACTIVE | Noted: 2024-05-30

## 2024-05-30 PROCEDURE — 97110 THERAPEUTIC EXERCISES: CPT | Mod: GP,CQ

## 2024-05-30 ASSESSMENT — PAIN SCALES - GENERAL: PAINLEVEL_OUTOF10: 7

## 2024-05-30 ASSESSMENT — PAIN - FUNCTIONAL ASSESSMENT: PAIN_FUNCTIONAL_ASSESSMENT: 0-10

## 2024-05-30 NOTE — PROGRESS NOTES
Physical Therapy Treatment    Patient Name: Valeri Mtz  MRN: [id number]  Today's Date: 5/30/2024  Time Calculation  Start Time: 1107  Stop Time: 1147  Time Calculation (min): 40 min        PT Therapeutic Procedures Time Entry  Therapeutic Exercise Time Entry: 40                Insurance:  Visit number: 7 of 8  Authorization info: 20 authorized   Insurance Type: Medicare          Current Problem  1. Chronic pain of left knee  Follow Up In Physical Therapy          General  Reason for Referral: L knee pain and difficulty walking  Referred By: Dr. Vogel      Subjective   Current Condition:   Same  Patient reports she tried her infinity belt (hula hoop type equipment) and feeling more soreness in both hips.     Performing HEP?: Partially    Precautions  Precautions  Medical Precautions: Fall precautions  Pain  Pain Assessment: 0-10  Pain Score: 7  Pain Location: Hip  Pain Orientation: Right, Left      Treatments:    Therapeutic Exercise  Therapeutic Exercise Performed: Yes     Physiostep x10 min for ROM/conditioning, dorsey B knee flexion and B hip ext 3x10x2#, dorsey back ext 2x10, supine bridges 3x10, side lying hip abd R/L 3x10, SLR 3x10, seated EOB hamstring stretch 3x30 sec R/L, pre stretch standing calf raises 3x10, hip abd machine 45# 3x10     EDUCATION:   Individual(s) Educated: Patient   Education Provided: Body Mechanics   Handout(s) Provided: Scanned into chart  Home Program: In previous notes  Risk and Benefits Discussed with Patient/Caregiver/Other: Yes   Patient/Caregiver Demonstrated Understanding: Yes   Patient Response to Education: Patient/Caregiver verbalized understanding of information and Patient/Caregiver performed return demonstration of exercises/activities    Assessment:   Patient continues to tolerate all interventions well with no complaints of increased pain only muscle fatigue        Plan:  Continue with POC and As per patient tolerated to improve ability to complete  functional tasks  Frequency: 2 x Week  Duration: 4 Weeks       Goals:  Active       PT Problem       The patient will ambulate 10 minutes continuously without pain increasing to greater than 1/10 to allow return to required community ambulation tasks.        Start:  04/30/24    Expected End:  05/30/24            The patient will improve their performance in the 5 time sit to stand test to < 15 sec to demonstrate increased balance and LE strength required for safe functional transfers and gait.        Start:  04/30/24    Expected End:  05/30/24            The patient will decrease their time in the timed up and go to <15 seconds to demonstrate improved balance and functional ambulatory ability.        Start:  04/30/24    Expected End:  05/30/24            The patient will report a reduction in pain to <=1/10 to allow them to return to required/desired ADL activities.        Start:  04/30/24    Expected End:  05/30/24            Increase hip abduction and thigh MMT to 5/5       Start:  04/30/24    Expected End:  05/30/24                 Maura Baires, PTA

## 2024-05-30 NOTE — PROGRESS NOTES
Reason for Nutrition Visit:  Pt is a 58 y.o. female being seen virtually, as a phone call only referred for   1. Obesity (BMI 35.0-39.9 without comorbidity)  Referral to Nutrition Services         Past Medical Hx:  Patient Active Problem List   Diagnosis    Cervicalgia    Depression    HTN (hypertension)    Hypercholesterolemia    Insomnia    Intracranial atherosclerosis    Osteoporosis    Paroxysmal atrial fibrillation (Multi)    Stroke (Multi)    CVA, old, aphasia    Urinary incontinence in female    Urine leukocytes    Vitamin D deficiency    Eczema    Chronic back pain    Hypokalemia    Cannabis use disorder    Chronic pain of left knee    Facet arthropathy, lumbosacral    Fear of    Generalized anxiety disorder    History of stroke    Lumbar radiculitis    Recurrent major depressive disorder (CMS-HCC)    S/P mitral valve replacement    Tubulovillous adenoma of rectum    Encounter for annual wellness visit (AWV) in Medicare patient    Difficulty walking    Obesity (BMI 35.0-39.9 without comorbidity)      Nutrition Assessment    Food & Nutrition Related History:  She is interested in eating better because she has had a stroke and she wants to eat more heart-healthy, she also wants to lose weight.    She reports 100 pounds of weight gain due to taking prednisone.   She has limited access to healthy foods due to food insecurity.     Food Allergies: kiwi  Intolerance: None  Appetite: Good, but although she eats only twice per day she thinks the meals are pretty small.   GI Symptoms : diarrhea/loose stools in the past year.   Swallowing Difficulty: No problems with swallowing  Chewing no problems chewing  Food Preparation: Patient  Cooking Skills/Barriers: None reported  Grocery Shopping: Patient  Food insecurity is present, gets $60 of SNAP per month. She visits the food pantry at [facility] as well. Didn't have meat at the end of this month. Has produce for about 1 week out of the month.   Supplements: Vitamin D,  "Calcium, Iron, and Potassium     Dietary Recall:   No breakfast  Lunch:  - leftovers or a sandwich  Dinner:   - she had been cooking when her son was living with her, but he hasn't been there for a month so she isn't cooking much. She eats chicken noodle soup, grilled cheese, or a hot dog.     Beverages: coffee until 3-4 pm. Water. Sometimes she purchases Coke, goes about a month between having one, enjoys it as a treat.   Eating out: almost never due to finances.   Alcohol Intake: occasionally.     Physical Activity: works out in the water 2-3 times per week, is also in physical therapy for better mobility with her knee so that she can walk on stairs more easily.     Labs:  Lab Results   Component Value Date    HGBA1C 5.2 11/28/2022     04/18/2024    K 4.3 04/18/2024     04/18/2024    CO2 30 04/18/2024    BUN 17 04/18/2024    CREATININE 1.09 (H) 04/18/2024    CALCIUM 9.5 04/18/2024    ALBUMIN 4.0 04/18/2024    PROT 6.5 04/18/2024    PROT 6.5 04/18/2024    BILITOT 0.5 04/18/2024    ALKPHOS 85 04/18/2024    ALT 14 04/18/2024    AST 17 04/18/2024    GLUCOSE 80 04/18/2024     Lab Results   Component Value Date    CHOL 207 (H) 04/18/2024    LDLCALC 92 04/18/2024    TRIG 318 (H) 04/18/2024    HDL 51.5 04/18/2024    LDLF 183 (H) 11/28/2022      Nutrition Focused Physical Exam:    Performed/Deferred: Deferred due to be being virtual visit    Anthropometrics:  Ht Readings from Last 1 Encounters:   04/18/24 1.651 m (5' 5\")     BMI Readings from Last 1 Encounters:   04/18/24 39.61 kg/m²     Wt Readings from Last 10 Encounters:   04/18/24 108 kg (238 lb)   12/22/23 109 kg (239 lb 12.8 oz)   09/06/23 105 kg (231 lb 0.2 oz)   07/19/23 102 kg (225 lb 12.8 oz)   07/11/23 103 kg (228 lb)   05/27/23 102 kg (224 lb)   05/17/23 103 kg (226 lb)   05/17/23 103 kg (226 lb)   05/02/23 102 kg (224 lb 2 oz)   04/18/23 102 kg (225 lb 12.8 oz)     Weight change: gained about 14# / 6% in the past year  Significant weight change: " No    Nutrition Diagnosis     Patient has Malnutrition Diagnosis: No        Patient has Nutrition Diagnosis: Yes Diagnosis Status (1): New  Nutrition Diagnosis 1: Limited access to food Related to (1): food insecurity As Evidenced by (1): she runs out of meat/produce through the month     Nutrition Interventions/Recommendations   Nutrition education on the following diet topic(s): Plate Method    Nutrition counseling using the following strategy: Nutrition Counseling: Goal Setting    Coordination of Care: Referring Provider    Education:  Talked about timing of meals. Advised that when she has adequate food available, spreading meals through the day can be helpful to promote limited portions of food at meals.     2. Encouraged patient to use the Plate Method to plan meals. The plate should be divided into even thirds.   - In one third, include starches / grains such as pasta, rice, quinoa, bread, wraps, corn, cereal, oatmeal, Thai toast, waffles, potatoes,   - In the next third, include protein foods such as meat (chicken, turkey, beef, fish) and plant based proteins such as peanut butter, other nut butters, tofu, beans  - In the last third, include fruits and vegetables. This can include fresh fruit, canned fruit, frozen fruit, fresh vegetables, frozen vegetables, canned vegetables, corn  - A dairy product can also be included with the meal, such as yogurt, milk or cheese. Dairy alternatives can also be used such as almond milk, soy milk, and oat milk. Dairy foods and dairy alternative foods are rich in calcium, and some of them are also rich in protein (cheese, milk, soy milk, cottage cheese, and yogurt). Others are not rich in protein (almond milk, oat milk).   - Fat should be included with the plate, it might be used when preparing the food or to flavor the food. Examples include oil, butter, cream cheese, salad dressing, mayonnaise, avocado, or nuts. Fat can help promote a sense of fullness with the meal so  it shouldn't be cut out of the meal entirely.    3. Discussed that in order to have healthier eating, it would be ideal to have better access to balanced groceries. Told her about the [employer] pantry at  and that I will ask her PCP to give her a referral. Also told her about [employer], but after our call I searched online and discovered there are no participating locations within 50 miles of her home. Told her about mobile pantries, and after our visit searched for some in her neighborhood. They are available:    - 12-3 second Tuesday of the month at  [facility] [address], [place] [address]. [phone]  - 10-12 third Monday of the month at [facility] [address] [place] [address].  [phone]   - 3-5 pm third Monday of the month at [facility], [address] [place] [address].  [phone]    33% Plate Method and [facility] Weight [employer] [facility] Metabolism, Good Food on a Tight Budget booklet, [employer] & Cheap cookbook, FFL flier    *Patient expressed understanding of the education provided and denied any additional questions/concerns.     Monitoring & Evaluation:  Monitoring & Evaluation: Amount of Food - Estimated, Types of Food, and Meal/Snack Pattern - Estimated, Access to food    Nutrition Goals:  She will continue to participate in local food pantry, will also consider visiting a mobile pantry at least once per month and also signing up for [employer]  She will aim to use the Plate Method with meals to guide the amount/type of food on her plate      Follow up Plan:   PRN, patient will call to schedule follow up if desired, will call her to let her know the status of [employer] referral    Readiness to Change : Good  Level of Understanding : Good  Anticipated Compliant : Good

## 2024-05-31 ENCOUNTER — TELEMEDICINE CLINICAL SUPPORT (OUTPATIENT)
Dept: NUTRITION | Facility: CLINIC | Age: 59
End: 2024-05-31
Payer: MEDICARE

## 2024-05-31 ENCOUNTER — SPECIALTY PHARMACY (OUTPATIENT)
Dept: PHARMACY | Facility: CLINIC | Age: 59
End: 2024-05-31

## 2024-05-31 DIAGNOSIS — Z00.00 HEALTH MAINTENANCE EXAMINATION: ICD-10-CM

## 2024-05-31 DIAGNOSIS — E66.9 OBESITY (BMI 35.0-39.9 WITHOUT COMORBIDITY): Primary | ICD-10-CM

## 2024-05-31 PROCEDURE — 97802 MEDICAL NUTRITION INDIV IN: CPT | Performed by: DIETITIAN, REGISTERED

## 2024-05-31 NOTE — Clinical Note
Misty Vogel or Dr. Santacruz,  I met with Valeri for a virtual visit today. Affordability of groceries is a barrier for her to be able to eat healthier at this time. I am wondering if either of you could enter a referral to the [employer] market at ? This is a food pantry she could visit once per month for supplemental groceries. The referral can be entered into Epic.  Thanks, Linda

## 2024-05-31 NOTE — PATIENT INSTRUCTIONS
Talked about timing of meals. Advised that when she has adequate food available, spreading meals through the day can be helpful to promote limited portions of food at meals.     2. Encouraged patient to use the Plate Method to plan meals. The plate should be divided into even thirds.   - In one third, include starches / grains such as pasta, rice, quinoa, bread, wraps, corn, cereal, oatmeal, Greek toast, waffles, potatoes,   - In the next third, include protein foods such as meat (chicken, turkey, beef, fish) and plant based proteins such as peanut butter, other nut butters, tofu, beans  - In the last third, include fruits and vegetables. This can include fresh fruit, canned fruit, frozen fruit, fresh vegetables, frozen vegetables, canned vegetables, corn  - A dairy product can also be included with the meal, such as yogurt, milk or cheese. Dairy alternatives can also be used such as almond milk, soy milk, and oat milk. Dairy foods and dairy alternative foods are rich in calcium, and some of them are also rich in protein (cheese, milk, soy milk, cottage cheese, and yogurt). Others are not rich in protein (almond milk, oat milk).   - Fat should be included with the plate, it might be used when preparing the food or to flavor the food. Examples include oil, butter, cream cheese, salad dressing, mayonnaise, avocado, or nuts. Fat can help promote a sense of fullness with the meal so it shouldn't be cut out of the meal entirely.    3. Discussed that in order to have healthier eating, it would be ideal to have better access to balanced groceries. Told her about the [employer] pantry at  and that I will ask her PCP to give her a referral. Also told her about [employer], but after our call I searched online and discovered there are no participating locations within 50 miles of her home. Told her about mobile pantries, and after our visit searched for some in her neighborhood. They are available:    - 12-3 second  Tuesday of the month at  [facility] [address], [place] [address]. [phone]  - 10-12 third Monday of the month at [facility], [address] [place] [address].  [phone]   - 3-5 pm third Monday of the month at [facility], [address] [facility] [address], [place] [address].  [phone]

## 2024-06-03 PROCEDURE — RXMED WILLOW AMBULATORY MEDICATION CHARGE

## 2024-06-04 ENCOUNTER — TREATMENT (OUTPATIENT)
Dept: PHYSICAL THERAPY | Facility: HOSPITAL | Age: 59
End: 2024-06-04
Payer: MEDICARE

## 2024-06-04 ENCOUNTER — DOCUMENTATION (OUTPATIENT)
Dept: PHYSICAL THERAPY | Facility: HOSPITAL | Age: 59
End: 2024-06-04

## 2024-06-04 DIAGNOSIS — G89.29 CHRONIC PAIN OF LEFT KNEE: ICD-10-CM

## 2024-06-04 DIAGNOSIS — M25.562 CHRONIC PAIN OF LEFT KNEE: ICD-10-CM

## 2024-06-04 PROCEDURE — 97110 THERAPEUTIC EXERCISES: CPT | Mod: GP

## 2024-06-04 ASSESSMENT — PAIN - FUNCTIONAL ASSESSMENT: PAIN_FUNCTIONAL_ASSESSMENT: 0-10

## 2024-06-04 ASSESSMENT — PAIN SCALES - GENERAL: PAINLEVEL_OUTOF10: 0 - NO PAIN

## 2024-06-04 NOTE — PROGRESS NOTES
Physical Therapy    Physical Therapy Treatment    Patient Name: Valeri Mtz  MRN: [id number]    Today's Date: 6/4/2024  Time Calculation  Start Time: 1345  Stop Time: 1427  Time Calculation (min): 42 min     PT Therapeutic Procedures Time Entry  Therapeutic Exercise Time Entry: 42     Assessment:  PT Assessment  Assessment Comment: Patient made excellent progress meeting or nearing all goals set on evaluation. She will continue on independent program.    Plan:  OP PT Plan  PT Plan: No Additional PT interventions required at this time    Current Problem  1. Chronic pain of left knee  Follow Up In Physical Therapy          General  PT  Visit  PT Received On: 06/04/24  General  General Comment: Patient feels she has made good progress and is beginning to return to walking outdoors and the [facility].    Subjective    Precautions   Fall precautions.    Pain  Pain Assessment  Pain Assessment: 0-10  Pain Score: 0 - No pain    Objective   Extremity/Trunk Assessment  5/5 L thigh ms. On MMT    Activity Tolerance:   Much improved.    Outcome Measures:  LEFS 48  5x STS 16 sec  TUG 13 sec.    Treatments:  Reassessment of progress.    Therapeutic Exercise  Therapeutic Exercise Performed: Yes  Therapeutic Exercise Activity 1: Physiostep 10min  Therapeutic Exercise Activity 2: Gottlieb knee flexion 3x10x2#  Therapeutic Exercise Activity 3: Gottlieb hip extension 3x10x2#  Therapeutic Exercise Activity 4: Gottlieb back extension 3x10  Therapeutic Exercise Activity 5: bridges 3 x10  Therapeutic Exercise Activity 6: SLR 3x10  Therapeutic Exercise Activity 7: SL abduction 3x10 each    OP EDUCATION:   Discussed therapeutic exercises recommended for her to continue with at [facility].       Goals:  Resolved       PT Problem       The patient will ambulate 10 minutes continuously without pain increasing to greater than 1/10 to allow return to required community ambulation tasks.  (Met)       Start:  04/30/24    Expected End:  05/30/24    Resolved:   06/04/24         The patient will improve their performance in the 5 time sit to stand test to < 15 sec to demonstrate increased balance and LE strength required for safe functional transfers and gait.  (Adequate for Discharge)       Start:  04/30/24    Expected End:  05/30/24            The patient will decrease their time in the timed up and go to <15 seconds to demonstrate improved balance and functional ambulatory ability.  (Met)       Start:  04/30/24    Expected End:  05/30/24    Resolved:  06/04/24         The patient will report a reduction in pain to <=1/10 to allow them to return to required/desired ADL activities.  (Met)       Start:  04/30/24    Expected End:  05/30/24    Resolved:  06/04/24         Increase hip abduction and thigh MMT to 5/5 (Met)       Start:  04/30/24    Expected End:  05/30/24    Resolved:  06/04/24

## 2024-06-04 NOTE — PROGRESS NOTES
Physical Therapy    Discharge Summary    Name: Valeri Mtz  MRN: [id number]  : 1965  Date: 24    Discharge Summary: PT    Discharge Information: Date of discharge , Date of last visit , and Referred for L knee pain    Rehab Discharge Reason: Achieved all and/or the most significant goals(s)

## 2024-06-07 ENCOUNTER — PHARMACY VISIT (OUTPATIENT)
Dept: PHARMACY | Facility: CLINIC | Age: 59
End: 2024-06-07
Payer: MEDICARE

## 2024-06-19 ENCOUNTER — APPOINTMENT (OUTPATIENT)
Dept: NUTRITION | Facility: HOSPITAL | Age: 59
End: 2024-06-19
Payer: MEDICARE

## 2024-06-24 ENCOUNTER — CLINICAL SUPPORT (OUTPATIENT)
Dept: NUTRITION | Facility: HOSPITAL | Age: 59
End: 2024-06-24
Payer: MEDICARE

## 2024-06-24 DIAGNOSIS — Z00.00 HEALTH MAINTENANCE EXAMINATION: ICD-10-CM

## 2024-06-24 NOTE — PROGRESS NOTES
[employer]  Diet Recommendation 1: MyPlate  Diet Recommendation 2: Healthy Eating  Diet Recommendation 3: Calories, Low  Food Intolerance Avoidance: Kiwi  Nutrition Goals Stated: Follow healthy diet with daughter for weight loss.  Household Size: 1 Family Member  Interventions: Referral Number: 1st 6 Mo Referral 6 Mos  Interventions: Visit Number: 1 of 6 Visits - Max 6 Visits/Referral Each 6 Mo Period  Education Today: MyPlate Meals (Healthy eating on a budget)  Grains: 0-25% Whole  Fruit: Fresh - 100%  Vegetables: % Fresh  Proteins: 1-2 Plant-based Items  Dairy: 25-50% Lowfat  Originating Site of Referral Order: [facility] Santacruz  Initials of RD Assisting Today: CATHERINE

## 2024-06-25 ENCOUNTER — APPOINTMENT (OUTPATIENT)
Dept: PRIMARY CARE | Facility: CLINIC | Age: 59
End: 2024-06-25
Payer: MEDICARE

## 2024-07-02 ENCOUNTER — SPECIALTY PHARMACY (OUTPATIENT)
Dept: PHARMACY | Facility: CLINIC | Age: 59
End: 2024-07-02

## 2024-07-02 PROCEDURE — RXMED WILLOW AMBULATORY MEDICATION CHARGE

## 2024-07-09 ENCOUNTER — PHARMACY VISIT (OUTPATIENT)
Dept: PHARMACY | Facility: CLINIC | Age: 59
End: 2024-07-09
Payer: MEDICARE

## 2024-07-16 DIAGNOSIS — M54.2 CERVICALGIA: ICD-10-CM

## 2024-07-16 DIAGNOSIS — E87.6 HYPOKALEMIA: ICD-10-CM

## 2024-07-16 RX ORDER — POTASSIUM CHLORIDE 750 MG/1
TABLET, EXTENDED RELEASE ORAL
Qty: 90 TABLET | Refills: 0 | Status: SHIPPED | OUTPATIENT
Start: 2024-07-16

## 2024-07-17 RX ORDER — DICLOFENAC SODIUM 10 MG/G
GEL TOPICAL
Qty: 100 G | Refills: 0 | Status: SHIPPED | OUTPATIENT
Start: 2024-07-17

## 2024-07-19 DIAGNOSIS — R32 URINARY INCONTINENCE, UNSPECIFIED TYPE: ICD-10-CM

## 2024-07-19 DIAGNOSIS — I10 HYPERTENSION, UNSPECIFIED TYPE: ICD-10-CM

## 2024-07-22 RX ORDER — OXYBUTYNIN CHLORIDE 5 MG/1
5 TABLET ORAL 2 TIMES DAILY
Qty: 180 TABLET | Refills: 0 | Status: SHIPPED | OUTPATIENT
Start: 2024-07-22

## 2024-07-22 RX ORDER — METOPROLOL TARTRATE 25 MG/1
25 TABLET, FILM COATED ORAL 2 TIMES DAILY
Qty: 180 TABLET | Refills: 1 | Status: SHIPPED | OUTPATIENT
Start: 2024-07-22

## 2024-07-23 ENCOUNTER — APPOINTMENT (OUTPATIENT)
Dept: NUTRITION | Facility: HOSPITAL | Age: 59
End: 2024-07-23
Payer: MEDICARE

## 2024-07-24 ENCOUNTER — APPOINTMENT (OUTPATIENT)
Dept: NEUROLOGY | Facility: CLINIC | Age: 59
End: 2024-07-24
Payer: MEDICARE

## 2024-07-24 VITALS
BODY MASS INDEX: 37.98 KG/M2 | SYSTOLIC BLOOD PRESSURE: 106 MMHG | HEIGHT: 66 IN | HEART RATE: 66 BPM | DIASTOLIC BLOOD PRESSURE: 69 MMHG | RESPIRATION RATE: 18 BRPM | WEIGHT: 236.33 LBS

## 2024-07-24 DIAGNOSIS — I67.2 INTRACRANIAL ATHEROSCLEROSIS: Primary | ICD-10-CM

## 2024-07-24 PROCEDURE — 99215 OFFICE O/P EST HI 40 MIN: CPT | Performed by: PSYCHIATRY & NEUROLOGY

## 2024-07-24 PROCEDURE — 3074F SYST BP LT 130 MM HG: CPT | Performed by: PSYCHIATRY & NEUROLOGY

## 2024-07-24 PROCEDURE — 1036F TOBACCO NON-USER: CPT | Performed by: PSYCHIATRY & NEUROLOGY

## 2024-07-24 PROCEDURE — 3008F BODY MASS INDEX DOCD: CPT | Performed by: PSYCHIATRY & NEUROLOGY

## 2024-07-24 PROCEDURE — 3078F DIAST BP <80 MM HG: CPT | Performed by: PSYCHIATRY & NEUROLOGY

## 2024-07-24 ASSESSMENT — PAIN SCALES - GENERAL: PAINLEVEL: 0-NO PAIN

## 2024-07-24 NOTE — PROGRESS NOTES
Assessment/Plan:  1. aphasia. Described as intermittent but sounds like occasional word searching due to left M2 stenosis. Could be secondary to atherosclerosis vs marijuana associated vasculopathy. I have asked her to quit using marijuana again (which she is using multiple times a day for about 3 years). Check lipid panel to assess response to statin.  Now on Praluent for cholesterol but LDL still 92 (goal is less than 70).  Advised to eat low cholesterol diet, continue working with PCP to reduce LDL.     2. Central pain: no longer taking gabapentin for her pain.  Advised that marijuana is not recommended for her pain.  Continue cymbalta and exercise.    Follow up  one year    HPI:   Valeri Mtz is a 58 y.o.  female with  history of atrial fibrillation who has stopped her apixaban, polymyalgia rheumatica, prior stroke, .mitral valve replacement who presented to [facility] with new aphasia. She was out of the window for TNK and was treated with dual antiplatelet therapy with aspirin and plavix. Her aphasia improved back to her baseline from after her second stroke which left her with mild aphasia. Workup showed severe left anterior division stensis. Of note the patient has been using daily marijuana several times a day for pain on the left side which she has had since her original stroke. When first seen she had never tried gabapentin for her pain. Of note she has had several instances of transient aphasia.     MRI: diffusion negative; old bilateral infarcts in the MCA territories. The one on the left would explain mild aphasia.  MRA: severe left anterior M2 stenosis. There is also less severe right M2 stenosis  Echo: Ejection fraction: 55-60%   Left Atrium: normal   Patent foramen ovale: uninterruptible bubble study  LDL: 184!--->92  HbA1C: 5.2    Since her last appointment she has continued to use marijuana several times a day for pain.  She is no longer taking gabapentin and cymbalta for pain.  She  admits to depression and suicidiality.  She is working with psychiatry and wants to move to [place] to be closer to her grandchildren.  She does not have a plan for suicide but does have a plan to move to [place].  She has been swimming and walking for exercise and feel her pain, including hip pain has improved with exercise and that cymbalta helps.    Current Outpatient Medications on File Prior to Visit   Medication Sig Dispense Refill    alirocumab (Praluent Pen) 150 mg/mL pen injector Inject 150mg (1 pen) beneath the skin once every 14 days. 2 mL 11    ARIPiprazole (Abilify) 2 mg tablet Take 1 tablet (2 mg) by mouth once daily at bedtime.      diclofenac sodium (Voltaren) 1 % gel APPLY SPARINGLY TO THE AFFECTED AREA TWO TO THREE TIMES A DAY AS NEEDED FOR PAIN 100 g 0    ferrous sulfate, 325 mg ferrous sulfate, tablet TAKE 1 TABLET (65 MG OF IRON) BY MOUTH ONCE DAILY WITH A MEAL. 30 tablet 2    hydrOXYzine HCL (Atarax) 25 mg tablet Take 1 tablet (25 mg) by mouth 3 times a day as needed for anxiety.      melatonin 5 mg capsule TAKE 5 MG BY MOUTH ONCE DAILY AT BEDTIME. 30 capsule 2    metoprolol tartrate (Lopressor) 25 mg tablet TAKE 1 TABLET BY MOUTH TWICE A  tablet 1    oxybutynin (Ditropan) 5 mg tablet TAKE 1 TABLET BY MOUTH TWICE A  tablet 0    potassium chloride CR 10 mEq ER tablet TAKE 1 TABLET BY MOUTH EVERY DAY *DO NOT CRUSH, CHEW, OR SPLIT 90 tablet 0    rivaroxaban (Xarelto) 20 mg tablet TAKE 1 TABLET (20 MG) BY MOUTH ONCE DAILY IN THE EVENING. TAKE WITH MEALS. TAKE WITH FOOD. 90 tablet 1    teriparatide (Forteo) injection INJECT 20 MCG UNDER THE SKIN ONCE DAILY 2.4 mL 6    triamcinolone (Kenalog) 0.1 % ointment APPLY TOPICALLY TWICE DAILY UNTIL HEALED      DULoxetine (Cymbalta) 60 mg DR capsule Take 1 capsule (60 mg) by mouth once daily. 30 capsule 4    [DISCONTINUED] evolocumab (Repatha) 140 mg/mL injection INJECT 140 MG UNDER THE SKIN EVERY 2 WEEKS. 2 mL 5    [DISCONTINUED] metoprolol  tartrate (Lopressor) 25 mg tablet TAKE 1 TABLET BY MOUTH TWICE A  tablet 1    [DISCONTINUED] oxybutynin (Ditropan) 5 mg tablet TAKE 1 TABLET BY MOUTH TWICE A  tablet 0     No current facility-administered medications on file prior to visit.        Patient Active Problem List   Diagnosis    Cervicalgia    HTN (hypertension)    Hypercholesterolemia    Insomnia    Intracranial atherosclerosis    Osteoporosis    Paroxysmal atrial fibrillation (Multi)    CVA, old, aphasia    Urinary incontinence in female    Vitamin D deficiency    Eczema    Chronic back pain    Cannabis use disorder    Chronic pain of left knee    Facet arthropathy, lumbosacral    Generalized anxiety disorder    Lumbar radiculitis    Recurrent major depressive disorder (CMS-[facility])    S/P mitral valve replacement    Tubulovillous adenoma of rectum    Encounter for annual wellness visit (AWV) in Medicare patient    Difficulty walking    Obesity (BMI 35.0-39.9 without comorbidity)        Past Surgical History:   Procedure Laterality Date    CT ABDOMEN PELVIS ANGIOGRAM W AND/OR WO IV CONTRAST  7/24/2022    CT ABDOMEN PELVIS ANGIOGRAM W AND/OR WO IV CONTRAST 7/24/2022 [facility]    CT ANGIO NECK  11/27/2022    CT NECK ANGIO W AND WO IV CONTRAST 11/27/2022 DOCTOR OFFICE LEGACY    MR HEAD ANGIO WO IV CONTRAST  11/27/2022    MR HEAD ANGIO WO IV CONTRAST 11/27/2022 DOCTOR OFFICE LEGAC[facility]    MR NECK ANGIO WO IV CONTRAST  11/27/2022    MR NECK ANGIO WO IV CONTRAST 11/27/2022 DOCTOR OFFICE LEGACY    OTHER SURGICAL HISTORY  12/16/2022    Mitral valve replacement    OTHER SURGICAL HISTORY  12/16/2022    Catheter ablation        Allergies   Allergen Reactions    Kiwi Other    Statins-Hmg-Coa Reductase Inhibitors Rash        Family History   Problem Relation Name Age of Onset    Diabetes Father      Diabetes Paternal Grandfather      Diabetes Other daughter         Social History     Tobacco Use    Smoking status: Former     Current packs/day: 0.00      Types: Cigarettes     Quit date:      Years since quittin.5    Smokeless tobacco: Never   Substance Use Topics    Alcohol use: Yes     Alcohol/week: 1.0 standard drink of alcohol     Types: 1 Standard drinks or equivalent per week    Drug use: Yes     Types: Marijuana        ROS:    General: No symptoms reported  Vision: No symptoms reported  ENT: Ringing in the Ears vertigo  Pulm: Cough   Cardiac: No symptoms reported  GI: Diarrhea   Urology: Incontinence   Rheum: Difficulty Walking   Derm: No symptoms reported  Neuro: Fainting   Psych: Depression , Anxiety , and Suicidal   Endo: No symptoms reported  Hem-Onc: Easy Bleeding  and Easy Bruising      Objective:  Visit Vitals  /69 (BP Location: Left arm, Patient Position: Sitting, BP Cuff Size: Large adult)   Pulse 66   Resp 18        Neurologic Exam:    Patient is alert, attentive with occasional word searching. She is able to name, repeat and follow complex crossed commands. She has normal speech. Extraocular eye movements are intact without nystagmus. Sensation is intact to patient light touch V1-3. Face is symmetric. Moves all extremities well and symmetrically. There is no drift, no orbiting and intact fine finger movements. Sensation is intact to light touch on the arms and legs bilaterally. Finger nose finger shows no ataxia. Patient is able to rise from a chair and walk normally.     NIHSS:  Level of Consciousness: 0=alert      LOC questions: 0=answers both questions      LOC commands: 0=performs both  Best Gaze: 0=normal  Visual: 0=normal  Facial Palsy: 0=normal  Motor:      Motor Arm (Left): 0=normal      Motor Arm (Right): 0=normal      Motor Leg (Left): 0=left leg normal      Motor Leg (Right): 0=right leg normal  Limb Ataxia: 0=absent  Sensory: 0=normal  Best Language: 1=mild-mod aphasia (can communicate ideas)  Dysarthria: 0=normal  Extinction and Inattention: 0=no abnormality     Total Score: 1    The chart was reviewed and summarized  as above.  Neuroimaging was personally reviewed and interpreted as documented in the HPI or Assessment  and Plan.

## 2024-07-24 NOTE — PATIENT INSTRUCTIONS
Stroke prevention:  Eat a healthy diet with plenty of fresh fruits and vegetables.  Avoid salt and fried foods.  Lose weight and exercise on a regular basis.  Do not smoke or use drugs.  Be sure to take all medications.  Call 911 for signs of stroke (numbness or weakness on one side, trouble seeing on one side, trouble speaking (either because your speech is slurred or you can't find the words), sudden double vision, spinning sensation or loss of coordination).    To find a stroke support group: https://www.stroke.org/en/stroke-support-group-finder  To learn about Virtual Stroke Educations sessions: contact Marietta Rand at: [email or web address]    I can be reached at phone: [phone] or email: [email or web address]

## 2024-07-29 NOTE — PROGRESS NOTES
Patient ID:   Valeri Mtz is a 58 y.o. female with PMH remarkable for Afib, polymyalgia rheumatica, stroke, h/o MVR who presents to the office today for Follow-up (6 month follow up/Needs referral for pulmonary and a podiatrist  ) and Medicare Annual Wellness Visit Subsequent.    HEALTH MAINTENANCE: Annual Medicare Wellness Physical  Mammogram (40-75): 2023 -> DUE  Pap smear (21-65, or hysterectomy q5yrs): 2023  Last Labs: 2024  Colonoscopy (45-75 or age 40 with 1st degree relative dx colon ca): 2024 with Dr Badillo with pathology of tubulovillous adenoma.  Echo 2023 showed LVSF 55-60%, abnormal pattern of LVDF, LA moderately dilated, bioprosthetic mitral valve, functioning normally.   - asking about marijuana d/t neuro stating that she needs to stop.  - reports that sometimes when she coughs she will sometimes be incontinent of bowel and bladder, and sometimes she will have syncope.  - rolls 3 joints per day for pain and anxiety.  - discussed with pt in detail that if she does not want to quit then she should see a physician that can give her medical marijuana card so she knows how much marijuana she is consuming per day.    Following Providers:  Cardio: Dr Krishna  Neuro: Dr Coe    Social History     Tobacco Use    Smoking status: Former     Current packs/day: 0.00     Types: Cigarettes     Quit date:      Years since quittin.5    Smokeless tobacco: Never   Substance Use Topics    Alcohol use: Not Currently     Alcohol/week: 1.0 standard drink of alcohol     Types: 1 Standard drinks or equivalent per week    Drug use: Yes     Types: Marijuana     Immunization History   Administered Date(s) Administered    Flu vaccine (IIV4), preservative free *Check age/dose* 2023    Influenza, injectable, quadrivalent 2016    Influenza, seasonal, injectable, preservative free 10/18/2017    Moderna COVID-19 vaccine, bivalent, blue cap/gray label *Check age/dose* 2023     "Moderna SARS-CoV-2 Vaccination 03/03/2021, 04/14/2021    Pneumococcal polysaccharide vaccine, 23-valent, age 2 years and older (PNEUMOVAX 23) 03/20/2015     Review of Systems   Constitutional:  Positive for fatigue.   Respiratory:  Positive for cough.    Musculoskeletal:  Positive for arthralgias and myalgias.   Hematological:  Bruises/bleeds easily.   Psychiatric/Behavioral:  The patient is nervous/anxious.    All other systems reviewed and are negative.      Allergies   Allergen Reactions    Kiwi Other    Statins-Hmg-Coa Reductase Inhibitors Rash       Visit Vitals  /73 (BP Location: Left arm, Patient Position: Sitting)   Pulse 72   Resp 18   Ht 1.651 m (5' 5\")   Wt 108 kg (239 lb)   SpO2 93%   BMI 39.77 kg/m²   Smoking Status Former   BSA 2.23 m²       Physical Exam  Vitals reviewed.   Constitutional:       General: She is not in acute distress.     Appearance: Normal appearance. She is overweight. She is not ill-appearing.   HENT:      Head: Normocephalic and atraumatic.      Right Ear: Hearing and external ear normal.      Left Ear: Hearing and external ear normal.      Nose: Nose normal.      Mouth/Throat:      Mouth: Mucous membranes are moist.      Pharynx: Oropharynx is clear.   Eyes:      Conjunctiva/sclera: Conjunctivae normal.      Pupils: Pupils are equal, round, and reactive to light.   Cardiovascular:      Rate and Rhythm: Normal rate and regular rhythm.      Heart sounds: Normal heart sounds. No murmur heard.  Pulmonary:      Effort: Pulmonary effort is normal. No respiratory distress.      Breath sounds: Decreased breath sounds present. No wheezing.   Abdominal:      General: There is no distension.      Palpations: Abdomen is soft. There is no mass.      Tenderness: There is no abdominal tenderness.   Musculoskeletal:      Cervical back: Normal range of motion and neck supple.      Right foot: Decreased range of motion. Swelling and tenderness present.   Feet:      Right foot:      Toenail " Condition: Fungal disease present.     Left foot:      Toenail Condition: Fungal disease present.  Skin:     General: Skin is warm and dry.      Findings: Bruising present.   Neurological:      General: No focal deficit present.      Mental Status: She is alert and oriented to person, place, and time.      Sensory: No sensory deficit.      Motor: No weakness.      Coordination: Coordination normal.      Gait: Gait normal.   Psychiatric:         Mood and Affect: Mood normal.         Behavior: Behavior normal. Behavior is cooperative.         Current Outpatient Medications   Medication Instructions    alirocumab (Praluent Pen) 150 mg/mL pen injector Inject 150mg (1 pen) beneath the skin once every 14 days.    ARIPiprazole (ABILIFY) 10 mg, oral, Nightly    busPIRone (BUSPAR) 10 mg, oral, 3 times daily    cholecalciferol (VITAMIN D3) 25 mcg, oral, 2 times weekly    diclofenac sodium (Voltaren) 1 % gel APPLY SPARINGLY TO THE AFFECTED AREA TWO TO THREE TIMES A DAY AS NEEDED FOR PAIN    DULoxetine (CYMBALTA) 60 mg, oral, Daily    ferrous sulfate, 325 mg ferrous sulfate, tablet 1 tablet, oral, Daily with breakfast    hydrOXYzine HCL (ATARAX) 25 mg, oral, 3 times daily PRN    melatonin 5 mg, oral, Nightly    metoprolol tartrate (LOPRESSOR) 25 mg, oral, 2 times daily    oxybutynin (DITROPAN) 5 mg, oral, 2 times daily    potassium chloride CR 10 mEq ER tablet TAKE 1 TABLET BY MOUTH EVERY DAY *DO NOT CRUSH, CHEW, OR SPLIT    rivaroxaban (Xarelto) 20 mg tablet TAKE 1 TABLET (20 MG) BY MOUTH ONCE DAILY IN THE EVENING. TAKE WITH MEALS. TAKE WITH FOOD.    teriparatide (Forteo) injection INJECT 20 MCG UNDER THE SKIN ONCE DAILY       Lab Results   Component Value Date    WBC 7.1 04/18/2024    HGB 14.6 04/18/2024    HCT 44.1 04/18/2024     04/18/2024    CHOL 207 (H) 04/18/2024    TRIG 318 (H) 04/18/2024    HDL 51.5 04/18/2024    ALT 14 04/18/2024    AST 17 04/18/2024     04/18/2024    K 4.3 04/18/2024     04/18/2024     CREATININE 1.09 (H) 04/18/2024    BUN 17 04/18/2024    CO2 30 04/18/2024    TSH 1.12 04/04/2023    INR 1.0 11/27/2022    HGBA1C 5.2 11/28/2022     Problem List Items Addressed This Visit             ICD-10-CM    Hypercholesterolemia E78.00     - c/w prauent injec every 2 wks  - FU with cardio as scheduled         Osteoporosis M81.0     - c/w forteo injec         Paroxysmal atrial fibrillation (Multi) I48.0     - c/w metoprolol for rate control  - c/w xarelto for stroke prevention         Cannabis use disorder F12.90     - discussed with her about marijuana vs medical marijuana. She will look into getting her medical marijuana card so we can know how much she is consuming at a time. The smoke inhalation from the 3 joints a day could be contributing to her cough         Generalized anxiety disorder F41.1     - c/w buspar, atarax PRN         Encounter for annual wellness visit (AWV) in Medicare patient Z00.00     - mammogram ordered         Fungal toenail infection B35.1     - start on anti fungal liquid to paint on toe nails  - refer to DPM. I do not rec PO medication to tx this.         Relevant Orders    Referral to Podiatry    XR foot right 3+ views    Chronic cough R05.3     - refer to pulm for an eval         Relevant Orders    Referral to Pulmonology    Injury of right foot S99.921A     - will get XRAY  - refer to DPM         Relevant Orders    XR foot right 3+ views     Other Visit Diagnoses         Codes    Breast screening     Z12.39    Relevant Orders    BI mammo bilateral screening tomosynthesis    Encounter for screening mammogram for malignant neoplasm of breast     Z12.31    Relevant Orders    BI mammo bilateral screening tomosynthesis          --------------------  Written by Lolita Maier RN, acting as a scribe for Dr. Juarez. This note accurately reflects the work and decisions made by Dr. Juarez.     I, Dr. Juarez, attest all medical record entries made by the scribe were under my direction and were  personally dictated by me. I have reviewed the chart and agree that the record accurately reflects my performance of the history, physical exam, and assessment and plan.

## 2024-07-30 ENCOUNTER — HOSPITAL ENCOUNTER (OUTPATIENT)
Dept: RADIOLOGY | Facility: CLINIC | Age: 59
Discharge: HOME | End: 2024-07-30
Payer: MEDICARE

## 2024-07-30 ENCOUNTER — APPOINTMENT (OUTPATIENT)
Dept: PRIMARY CARE | Facility: CLINIC | Age: 59
End: 2024-07-30
Payer: MEDICARE

## 2024-07-30 VITALS
BODY MASS INDEX: 39.82 KG/M2 | DIASTOLIC BLOOD PRESSURE: 73 MMHG | WEIGHT: 239 LBS | OXYGEN SATURATION: 93 % | RESPIRATION RATE: 18 BRPM | HEIGHT: 65 IN | HEART RATE: 72 BPM | SYSTOLIC BLOOD PRESSURE: 118 MMHG

## 2024-07-30 DIAGNOSIS — S99.921A INJURY OF RIGHT FOOT, INITIAL ENCOUNTER: ICD-10-CM

## 2024-07-30 DIAGNOSIS — M81.0 OSTEOPOROSIS WITHOUT CURRENT PATHOLOGICAL FRACTURE, UNSPECIFIED OSTEOPOROSIS TYPE: ICD-10-CM

## 2024-07-30 DIAGNOSIS — B35.1 FUNGAL TOENAIL INFECTION: ICD-10-CM

## 2024-07-30 DIAGNOSIS — I48.0 PAROXYSMAL ATRIAL FIBRILLATION (MULTI): ICD-10-CM

## 2024-07-30 DIAGNOSIS — F41.1 GENERALIZED ANXIETY DISORDER: ICD-10-CM

## 2024-07-30 DIAGNOSIS — Z12.31 ENCOUNTER FOR SCREENING MAMMOGRAM FOR MALIGNANT NEOPLASM OF BREAST: ICD-10-CM

## 2024-07-30 DIAGNOSIS — Z12.39 BREAST SCREENING: ICD-10-CM

## 2024-07-30 DIAGNOSIS — R05.3 CHRONIC COUGH: ICD-10-CM

## 2024-07-30 DIAGNOSIS — F12.90 CANNABIS USE DISORDER: ICD-10-CM

## 2024-07-30 DIAGNOSIS — E78.00 HYPERCHOLESTEROLEMIA: ICD-10-CM

## 2024-07-30 DIAGNOSIS — Z00.00 ENCOUNTER FOR ANNUAL WELLNESS VISIT (AWV) IN MEDICARE PATIENT: ICD-10-CM

## 2024-07-30 PROBLEM — F32.A DEPRESSIVE DISORDER: Status: ACTIVE | Noted: 2021-11-03

## 2024-07-30 PROBLEM — I63.9 ISCHEMIC CEREBROVASCULAR ACCIDENT (CVA) (MULTI): Status: ACTIVE | Noted: 2022-11-27

## 2024-07-30 PROBLEM — I25.10 CORONARY ARTERIOSCLEROSIS: Status: ACTIVE | Noted: 2021-11-03

## 2024-07-30 PROBLEM — Z86.73 HISTORY OF CEREBROVASCULAR ACCIDENT: Status: ACTIVE | Noted: 2023-07-19

## 2024-07-30 PROBLEM — Z86.79 HISTORY OF RHEUMATIC FEVER: Status: ACTIVE | Noted: 2024-07-30

## 2024-07-30 PROCEDURE — 73630 X-RAY EXAM OF FOOT: CPT | Mod: RIGHT SIDE | Performed by: RADIOLOGY

## 2024-07-30 PROCEDURE — 3074F SYST BP LT 130 MM HG: CPT | Performed by: INTERNAL MEDICINE

## 2024-07-30 PROCEDURE — 99214 OFFICE O/P EST MOD 30 MIN: CPT | Performed by: INTERNAL MEDICINE

## 2024-07-30 PROCEDURE — 73630 X-RAY EXAM OF FOOT: CPT | Mod: RT

## 2024-07-30 PROCEDURE — G0439 PPPS, SUBSEQ VISIT: HCPCS | Performed by: INTERNAL MEDICINE

## 2024-07-30 PROCEDURE — 3078F DIAST BP <80 MM HG: CPT | Performed by: INTERNAL MEDICINE

## 2024-07-30 PROCEDURE — 3008F BODY MASS INDEX DOCD: CPT | Performed by: INTERNAL MEDICINE

## 2024-07-30 PROCEDURE — 1036F TOBACCO NON-USER: CPT | Performed by: INTERNAL MEDICINE

## 2024-07-30 RX ORDER — CITALOPRAM 20 MG/1
1 TABLET, FILM COATED ORAL DAILY
COMMUNITY
End: 2024-07-30 | Stop reason: ALTCHOICE

## 2024-07-30 RX ORDER — ATORVASTATIN CALCIUM 40 MG/1
1 TABLET, FILM COATED ORAL DAILY
COMMUNITY
End: 2024-07-30 | Stop reason: SINTOL

## 2024-07-30 RX ORDER — VIT C/E/ZN/COPPR/LUTEIN/ZEAXAN 250MG-90MG
25 CAPSULE ORAL 2 TIMES WEEKLY
COMMUNITY

## 2024-07-30 RX ORDER — BUSPIRONE HYDROCHLORIDE 10 MG/1
10 TABLET ORAL 3 TIMES DAILY
COMMUNITY

## 2024-07-30 RX ORDER — ARIPIPRAZOLE 10 MG/1
10 TABLET ORAL NIGHTLY
COMMUNITY
Start: 2024-07-24

## 2024-07-30 ASSESSMENT — PAIN SCALES - GENERAL: PAINLEVEL: 0-NO PAIN

## 2024-07-30 ASSESSMENT — ACTIVITIES OF DAILY LIVING (ADL)
BATHING: INDEPENDENT
DRESSING: INDEPENDENT
DOING_HOUSEWORK: INDEPENDENT
MANAGING_FINANCES: INDEPENDENT
GROCERY_SHOPPING: INDEPENDENT
TAKING_MEDICATION: INDEPENDENT

## 2024-07-30 ASSESSMENT — PATIENT HEALTH QUESTIONNAIRE - PHQ9
1. LITTLE INTEREST OR PLEASURE IN DOING THINGS: NOT AT ALL
SUM OF ALL RESPONSES TO PHQ9 QUESTIONS 1 AND 2: 0
2. FEELING DOWN, DEPRESSED OR HOPELESS: NOT AT ALL

## 2024-07-30 NOTE — PATIENT INSTRUCTIONS
It was nice to see you in the office today!  As discussed during our visit...     Regarding the toe nail fungus, try to find...  Or something similar.  We placed a referral for podiatry (foot doctor) for you.  We placed a referral for pulmonology (lung doctor) for you.  ---------------------------------------------  Dr Juarez has ordered a mammogram for you to obtain. This will take approximately 30 minutes. You can schedule this on the [facility] website or through the link below.    Breast cancer cannot be prevented but early detection can keep the disease from spreading and greatly increase your odds for a favorable outcome.    Patients with dense breast tissue, those who have an average or mildly elevated risk for developing breast cancer or those who have a family history of the disease can schedule a low-cost breast cancer screening that takes approximately 10 minutes. This supplemental screening is self-pay ($250*) and is available at several  locations that perform breast MRIs. To learn more about the Fast MRI program, call [phone].    Website with more information and access to schedule online:  [email or web address]   ---------------------  Regarding the medical marijuana:  For in person:  https://[employer].gov/divisions-and-programs/cannabis-control/patients-caregivers/obtain-medical-marijuana    For virtual visit:  https://[employer]/medical-marijuana-card/find-[facility]-doctors-near-me-how-to-get-prescribed-cannabis-online/#:~:text=If%20approved%2C%20you'll%20[place]%20medical%20[place]%20program%20website.

## 2024-07-31 ENCOUNTER — SPECIALTY PHARMACY (OUTPATIENT)
Dept: PHARMACY | Facility: CLINIC | Age: 59
End: 2024-07-31

## 2024-07-31 ENCOUNTER — APPOINTMENT (OUTPATIENT)
Dept: NUTRITION | Facility: HOSPITAL | Age: 59
End: 2024-07-31
Payer: MEDICARE

## 2024-07-31 ENCOUNTER — TELEPHONE (OUTPATIENT)
Dept: NEUROLOGY | Facility: HOSPITAL | Age: 59
End: 2024-07-31

## 2024-07-31 DIAGNOSIS — M81.0 OSTEOPOROSIS WITHOUT CURRENT PATHOLOGICAL FRACTURE, UNSPECIFIED OSTEOPOROSIS TYPE: ICD-10-CM

## 2024-07-31 PROBLEM — R05.3 CHRONIC COUGH: Status: ACTIVE | Noted: 2024-07-31

## 2024-07-31 PROBLEM — S99.921A INJURY OF RIGHT FOOT: Status: ACTIVE | Noted: 2024-07-31

## 2024-07-31 PROBLEM — B35.1 FUNGAL TOENAIL INFECTION: Status: ACTIVE | Noted: 2024-07-31

## 2024-07-31 PROCEDURE — RXMED WILLOW AMBULATORY MEDICATION CHARGE

## 2024-07-31 RX ORDER — TERIPARATIDE 250 UG/ML
INJECTION, SOLUTION SUBCUTANEOUS
Qty: 2.4 ML | Refills: 6 | Status: SHIPPED | OUTPATIENT
Start: 2024-07-31 | End: 2025-07-31

## 2024-07-31 ASSESSMENT — ENCOUNTER SYMPTOMS
NERVOUS/ANXIOUS: 1
ARTHRALGIAS: 1
FATIGUE: 1
BRUISES/BLEEDS EASILY: 1
COUGH: 1
MYALGIAS: 1

## 2024-07-31 NOTE — ASSESSMENT & PLAN NOTE
- start on anti fungal liquid to paint on toe nails  - refer to DPM. I do not rec PO medication to tx this.

## 2024-07-31 NOTE — ASSESSMENT & PLAN NOTE
- discussed with her about marijuana vs medical marijuana. She will look into getting her medical marijuana card so we can know how much she is consuming at a time. The smoke inhalation from the 3 joints a day could be contributing to her cough

## 2024-07-31 NOTE — ASSESSMENT & PLAN NOTE
- c/w metoprolol for rate control  - c/w xarelto for stroke prevention   What Type Of Note Output Would You Prefer (Optional)?: Standard Output Is This A New Presentation, Or A Follow-Up?: Moles

## 2024-08-06 PROCEDURE — RXMED WILLOW AMBULATORY MEDICATION CHARGE

## 2024-08-08 ENCOUNTER — PHARMACY VISIT (OUTPATIENT)
Dept: PHARMACY | Facility: CLINIC | Age: 59
End: 2024-08-08
Payer: MEDICARE

## 2024-08-10 ENCOUNTER — APPOINTMENT (OUTPATIENT)
Dept: OBSTETRICS AND GYNECOLOGY | Facility: CLINIC | Age: 59
End: 2024-08-10
Payer: MEDICARE

## 2024-08-13 ENCOUNTER — PATIENT OUTREACH (OUTPATIENT)
Dept: PRIMARY CARE | Facility: CLINIC | Age: 59
End: 2024-08-13
Payer: MEDICARE

## 2024-08-13 NOTE — PROGRESS NOTES
Discharge Facility: [facility]  Discharge Diagnosis: STROKE OF UNKNOWN ETIOLOG   Admission Date: 11 Aug 24  Discharge Date: 12 Aug 24    PCP Appointment Date: Tasked to office for scheduling  Specialist Appointment Date: 15 Aug 24 (Pulmonology, [place])  Hospital Encounter and Summary Linked: Yes    No contact on discharge outreach after 2 attempts. Tasked to office for scheduling. Will attempt outreach again in 2 wks.

## 2024-08-14 ENCOUNTER — CLINICAL SUPPORT (OUTPATIENT)
Dept: NUTRITION | Facility: HOSPITAL | Age: 59
End: 2024-08-14
Payer: MEDICARE

## 2024-08-14 NOTE — PROGRESS NOTES
[employer]  Diet Recommendation 1: Heart Healthy  Diet Recommendation 2: Protein, Low  Diet Recommendation 3: Healthy Eating  Other Diet Recommendations: MyPlate  Food Intolerance Avoidance: Kiwi  Nutrition Goals Stated: Follow healthy diet with daughter for weight loss.  Household Size: 1 Family Member  Interventions: Referral Number: 1st 6 Mo Referral 6 Mos  Interventions: Visit Number: 2 of 6 Visits - Max 6 Visits/Referral Each 6 Mo Period  Education Today: [employer] Meals  Follow Up Notes for Future Visits: Pt was in the hospital last week due to heart issues. Pt seeing cardiologist soon.  Grains: 25-50% Whole  Fruit: Fresh - 100%  Vegetables: % Fresh  Proteins: 1-2 Plant-based Items  Dairy: 25-50% Lowfat  Originating Site of Referral Order: [facility]  Initials of RD Assisting Today: CATHERINE

## 2024-08-15 ENCOUNTER — APPOINTMENT (OUTPATIENT)
Dept: PULMONOLOGY | Facility: CLINIC | Age: 59
End: 2024-08-15
Payer: MEDICARE

## 2024-08-15 VITALS
DIASTOLIC BLOOD PRESSURE: 79 MMHG | BODY MASS INDEX: 39.04 KG/M2 | HEART RATE: 73 BPM | WEIGHT: 234.6 LBS | SYSTOLIC BLOOD PRESSURE: 130 MMHG | OXYGEN SATURATION: 94 %

## 2024-08-15 DIAGNOSIS — R06.02 SOB (SHORTNESS OF BREATH): ICD-10-CM

## 2024-08-15 DIAGNOSIS — J34.89 RHINORRHEA: ICD-10-CM

## 2024-08-15 DIAGNOSIS — F12.90 CANNABIS USE DISORDER: Primary | ICD-10-CM

## 2024-08-15 DIAGNOSIS — R05.3 CHRONIC COUGH: ICD-10-CM

## 2024-08-15 PROCEDURE — 3075F SYST BP GE 130 - 139MM HG: CPT | Performed by: PEDIATRICS

## 2024-08-15 PROCEDURE — 99205 OFFICE O/P NEW HI 60 MIN: CPT | Performed by: PEDIATRICS

## 2024-08-15 PROCEDURE — 1036F TOBACCO NON-USER: CPT | Performed by: PEDIATRICS

## 2024-08-15 PROCEDURE — 3078F DIAST BP <80 MM HG: CPT | Performed by: PEDIATRICS

## 2024-08-15 NOTE — PROGRESS NOTES
"Subjective   Patient ID: Valeri Mtz is a 58 y.o. female who presents for cough    HPI    Ms Mtz is a 58yr old that has had a dry cough that is severe enough for her to have syncope at times. She does not cough at night.  This has been going on for a few months now.   She feels like there are electric charges running across her chest when that happens. She also notes she has  post nasal drip and a tickle in her throat that sometimes causes the cough.  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    The majority of the time she starts coughing is associated with smoking marijuana.    She states she does not want to quit smoking, she has tried edibles but \"it's just not the same\"  She also endorses shortness of breath with exertion.     She is a former tobacco smoker 1ppd from age [age] she continues to smoke marijuana started at age 14      Review of Systems    See scanned documents attached to this note for review of systems, and appropriate scales/scores for this visit.     Objective   Physical Exam  Constitutional:       Appearance: Normal appearance.   HENT:      Head: Normocephalic and atraumatic.      Mouth/Throat:      Pharynx: Oropharynx is clear.   Cardiovascular:      Rate and Rhythm: Normal rate and regular rhythm.      Pulses: Normal pulses.      Heart sounds: Normal heart sounds.   Pulmonary:      Effort: Pulmonary effort is normal.      Breath sounds: Normal breath sounds. No wheezing, rhonchi or rales.   Abdominal:      General: Bowel sounds are normal.      Palpations: Abdomen is soft.   Musculoskeletal:         General: Normal range of motion.   Skin:     General: Skin is warm and dry.   Neurological:      General: No focal deficit present.      Mental Status: She is alert and oriented to person, place, and time.   Psychiatric:         Mood and Affect: Mood normal.       Assessment/Plan     58yr old with cough and cough symcope often induced by smoking marijuana but also with post " nasal drip and a tickle in her throat that causes cough      Cough: per patient related to smoking marijuana , she does not want to stop  - will get PFT and 6MWT to assure no pulmonary issues  - encouraged to use alternative forms of marijuana and see if the cough goes away    2. Post nasal drip:  also likely contributing to cough.    - gave contact info for Dr Herrera      Follow up after PFT          Deepak Triplett MD 08/15/24 9:10 AM

## 2024-08-15 NOTE — LETTER
"August 15, 2024     Kan Santacruz MD  [address] [address]    Patient: Valeri Mtz   YOB: 1965   Date of Visit: 8/15/2024       Dear Dr. Kan Santacruz MD:    Thank you for referring Valeri Mtz to me for evaluation. Below are my notes for this consultation.  If you have questions, please do not hesitate to call me. I look forward to following your patient along with you.       Sincerely,     Deepak Triplett MD      CC: No Recipients  ______________________________________________________________________________________    Subjective  Patient ID: Valeri Mtz is a 58 y.o. female who presents for cough    HPI    Ms Mtz is a 58yr old that has had a dry cough that is severe enough for her to have syncope at times. She does not cough at night.  This has been going on for a few months now.   She feels like there are electric charges running across her chest when that happens. She also notes she has  post nasal drip and a tickle in her throat that sometimes causes the cough.  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    The majority of the time she starts coughing is associated with smoking marijuana.    She states she does not want to quit smoking, she has tried edibles but \"it's just not the same\"  She also endorses shortness of breath with exertion.     She is a former tobacco smoker 1ppd from age [age] she continues to smoke marijuana started at age 14      Review of Systems    See scanned documents attached to this note for review of systems, and appropriate scales/scores for this visit.     Objective  Physical Exam  Constitutional:       Appearance: Normal appearance.   HENT:      Head: Normocephalic and atraumatic.      Mouth/Throat:      Pharynx: Oropharynx is clear.   Cardiovascular:      Rate and Rhythm: Normal rate and regular rhythm.      Pulses: Normal pulses.      Heart sounds: Normal heart sounds.   Pulmonary:      Effort: Pulmonary " effort is normal.      Breath sounds: Normal breath sounds. No wheezing, rhonchi or rales.   Abdominal:      General: Bowel sounds are normal.      Palpations: Abdomen is soft.   Musculoskeletal:         General: Normal range of motion.   Skin:     General: Skin is warm and dry.   Neurological:      General: No focal deficit present.      Mental Status: She is alert and oriented to person, place, and time.   Psychiatric:         Mood and Affect: Mood normal.       Assessment/Plan    58yr old with cough and cough symcope often induced by smoking marijuana but also with post nasal drip and a tickle in her throat that causes cough      Cough: per patient related to smoking marijuana , she does not want to stop  - will get PFT and 6MWT to assure no pulmonary issues  - encouraged to use alternative forms of marijuana and see if the cough goes away    2. Post nasal drip:  also likely contributing to cough.    - gave contact info for Dr Herrera      Follow up after PFT          Deepak Triplett MD 08/15/24 9:10 AM

## 2024-08-19 ENCOUNTER — APPOINTMENT (OUTPATIENT)
Dept: PRIMARY CARE | Facility: CLINIC | Age: 59
End: 2024-08-19
Payer: MEDICARE

## 2024-08-21 ENCOUNTER — HOSPITAL ENCOUNTER (OUTPATIENT)
Dept: RESPIRATORY THERAPY | Facility: HOSPITAL | Age: 59
Discharge: HOME | End: 2024-08-21
Payer: MEDICARE

## 2024-08-21 DIAGNOSIS — R05.3 CHRONIC COUGH: ICD-10-CM

## 2024-08-21 PROCEDURE — 94618 PULMONARY STRESS TESTING: CPT

## 2024-08-21 PROCEDURE — 94664 DEMO&/EVAL PT USE INHALER: CPT

## 2024-08-21 PROCEDURE — 94060 EVALUATION OF WHEEZING: CPT

## 2024-08-21 PROCEDURE — 94727 GAS DIL/WSHOT DETER LNG VOL: CPT

## 2024-08-21 PROCEDURE — 94729 DIFFUSING CAPACITY: CPT

## 2024-08-21 PROCEDURE — 98960 EDU&TRN PT SELF-MGMT NQHP 1: CPT

## 2024-08-23 LAB
MGC ASCENT PFT - FEV1 - POST: 2.21
MGC ASCENT PFT - FEV1 - PRE: 2.15
MGC ASCENT PFT - FEV1 - PREDICTED: 2.56
MGC ASCENT PFT - FVC - POST: 3.04
MGC ASCENT PFT - FVC - PRE: 3.05
MGC ASCENT PFT - FVC - PREDICTED: 3.24

## 2024-08-28 ENCOUNTER — PATIENT OUTREACH (OUTPATIENT)
Dept: PRIMARY CARE | Facility: CLINIC | Age: 59
End: 2024-08-28
Payer: MEDICARE

## 2024-08-28 NOTE — PROGRESS NOTES
2 wk call back complete. Pt states she is doing well at this time and has no questions. Follow up appt canceled and reschedule for outside 14 day window

## 2024-08-30 ENCOUNTER — SPECIALTY PHARMACY (OUTPATIENT)
Dept: PHARMACY | Facility: CLINIC | Age: 59
End: 2024-08-30

## 2024-08-30 PROCEDURE — RXMED WILLOW AMBULATORY MEDICATION CHARGE

## 2024-09-04 ENCOUNTER — PHARMACY VISIT (OUTPATIENT)
Dept: PHARMACY | Facility: CLINIC | Age: 59
End: 2024-09-04
Payer: MEDICARE

## 2024-09-09 ENCOUNTER — PATIENT OUTREACH (OUTPATIENT)
Dept: PRIMARY CARE | Facility: CLINIC | Age: 59
End: 2024-09-09
Payer: MEDICARE

## 2024-09-13 ENCOUNTER — APPOINTMENT (OUTPATIENT)
Dept: PULMONOLOGY | Facility: CLINIC | Age: 59
End: 2024-09-13
Payer: MEDICARE

## 2024-09-16 ENCOUNTER — APPOINTMENT (OUTPATIENT)
Dept: PRIMARY CARE | Facility: CLINIC | Age: 59
End: 2024-09-16
Payer: MEDICARE

## 2024-09-27 ENCOUNTER — SPECIALTY PHARMACY (OUTPATIENT)
Dept: PHARMACY | Facility: CLINIC | Age: 59
End: 2024-09-27

## 2024-09-27 ENCOUNTER — APPOINTMENT (OUTPATIENT)
Dept: OTOLARYNGOLOGY | Facility: CLINIC | Age: 59
End: 2024-09-27
Payer: MEDICARE

## 2024-10-01 ENCOUNTER — SPECIALTY PHARMACY (OUTPATIENT)
Dept: PHARMACY | Facility: CLINIC | Age: 59
End: 2024-10-01

## 2024-10-15 ENCOUNTER — APPOINTMENT (OUTPATIENT)
Dept: CARDIOLOGY | Facility: CLINIC | Age: 59
End: 2024-10-15
Payer: MEDICARE

## 2024-10-25 DIAGNOSIS — F32.A DEPRESSION, UNSPECIFIED DEPRESSION TYPE: ICD-10-CM

## 2024-10-25 DIAGNOSIS — I10 HYPERTENSION, UNSPECIFIED TYPE: ICD-10-CM

## 2024-10-25 DIAGNOSIS — R32 URINARY INCONTINENCE, UNSPECIFIED TYPE: ICD-10-CM

## 2024-10-25 DIAGNOSIS — I48.0 PAROXYSMAL ATRIAL FIBRILLATION (MULTI): ICD-10-CM

## 2024-10-25 RX ORDER — HYDROXYZINE HYDROCHLORIDE 25 MG/1
25 TABLET, FILM COATED ORAL 3 TIMES DAILY PRN
Qty: 90 TABLET | Refills: 0 | Status: CANCELLED | OUTPATIENT
Start: 2024-10-25

## 2024-10-25 RX ORDER — METOPROLOL TARTRATE 25 MG/1
25 TABLET, FILM COATED ORAL 2 TIMES DAILY
Qty: 180 TABLET | Refills: 1 | Status: SHIPPED | OUTPATIENT
Start: 2024-10-25

## 2024-10-25 RX ORDER — BUSPIRONE HYDROCHLORIDE 10 MG/1
10 TABLET ORAL 3 TIMES DAILY
Qty: 90 TABLET | Refills: 0 | Status: CANCELLED | OUTPATIENT
Start: 2024-10-25

## 2024-10-25 RX ORDER — DULOXETIN HYDROCHLORIDE 60 MG/1
60 CAPSULE, DELAYED RELEASE ORAL DAILY
Qty: 30 CAPSULE | Refills: 0 | Status: CANCELLED | OUTPATIENT
Start: 2024-10-25 | End: 2025-03-12

## 2024-10-25 RX ORDER — OXYBUTYNIN CHLORIDE 5 MG/1
5 TABLET ORAL 2 TIMES DAILY
Qty: 180 TABLET | Refills: 0 | Status: SHIPPED | OUTPATIENT
Start: 2024-10-25

## 2024-10-29 DIAGNOSIS — M81.0 OSTEOPOROSIS WITHOUT CURRENT PATHOLOGICAL FRACTURE, UNSPECIFIED OSTEOPOROSIS TYPE: ICD-10-CM

## 2024-10-29 RX ORDER — TERIPARATIDE 250 UG/ML
INJECTION, SOLUTION SUBCUTANEOUS
Qty: 2.4 ML | Refills: 2 | Status: SHIPPED | OUTPATIENT
Start: 2024-10-29 | End: 2025-10-29

## 2024-11-01 ENCOUNTER — APPOINTMENT (OUTPATIENT)
Dept: RADIOLOGY | Facility: CLINIC | Age: 59
End: 2024-11-01
Payer: MEDICARE

## 2024-11-06 ENCOUNTER — APPOINTMENT (OUTPATIENT)
Dept: PODIATRY | Facility: CLINIC | Age: 59
End: 2024-11-06
Payer: MEDICARE

## 2024-11-08 ENCOUNTER — PATIENT OUTREACH (OUTPATIENT)
Dept: PRIMARY CARE | Facility: CLINIC | Age: 59
End: 2024-11-08
Payer: MEDICARE

## 2024-11-08 NOTE — PROGRESS NOTES
No contact on 90 day outreach attempt. Unable to leave message. Patient has graduated from TCM program at this time.

## 2024-11-12 ENCOUNTER — APPOINTMENT (OUTPATIENT)
Dept: CARDIOLOGY | Facility: CLINIC | Age: 59
End: 2024-11-12
Payer: MEDICARE

## 2025-02-07 DIAGNOSIS — I48.0 PAROXYSMAL ATRIAL FIBRILLATION (MULTI): ICD-10-CM

## 2025-02-07 DIAGNOSIS — R32 URINARY INCONTINENCE, UNSPECIFIED TYPE: ICD-10-CM

## 2025-02-07 RX ORDER — OXYBUTYNIN CHLORIDE 5 MG/1
5 TABLET ORAL 2 TIMES DAILY
Qty: 180 TABLET | Refills: 0 | Status: SHIPPED | OUTPATIENT
Start: 2025-02-07

## 2025-02-26 ENCOUNTER — APPOINTMENT (OUTPATIENT)
Dept: ENDOCRINOLOGY | Facility: CLINIC | Age: 60
End: 2025-02-26
Payer: MEDICARE

## 2025-03-06 DIAGNOSIS — R32 URINARY INCONTINENCE, UNSPECIFIED TYPE: ICD-10-CM

## 2025-03-07 RX ORDER — OXYBUTYNIN CHLORIDE 5 MG/1
5 TABLET ORAL 2 TIMES DAILY
Qty: 180 TABLET | Refills: 0 | Status: SHIPPED | OUTPATIENT
Start: 2025-03-07

## 2025-05-14 DIAGNOSIS — R32 URINARY INCONTINENCE, UNSPECIFIED TYPE: ICD-10-CM

## 2025-05-14 RX ORDER — OXYBUTYNIN CHLORIDE 5 MG/1
5 TABLET ORAL 2 TIMES DAILY
Qty: 180 TABLET | Refills: 0 | Status: SHIPPED | OUTPATIENT
Start: 2025-05-14

## 2025-06-27 DIAGNOSIS — I48.0 PAROXYSMAL ATRIAL FIBRILLATION (MULTI): ICD-10-CM

## 2025-07-28 ENCOUNTER — APPOINTMENT (OUTPATIENT)
Dept: NEUROLOGY | Facility: CLINIC | Age: 60
End: 2025-07-28
Payer: MEDICARE